# Patient Record
Sex: FEMALE | Race: WHITE | Employment: UNEMPLOYED | URBAN - METROPOLITAN AREA
[De-identification: names, ages, dates, MRNs, and addresses within clinical notes are randomized per-mention and may not be internally consistent; named-entity substitution may affect disease eponyms.]

---

## 2017-05-15 ENCOUNTER — HOSPITAL ENCOUNTER (OUTPATIENT)
Dept: LAB | Age: 27
Discharge: HOME | End: 2017-05-15
Attending: PSYCHIATRY & NEUROLOGY | Admitting: PSYCHIATRY & NEUROLOGY

## 2017-05-15 LAB
ANION GAP SERPL CALCULATED.3IONS-SCNC: 12 MMOL/L (ref 4–16)
BUN BLDV-MCNC: 11 MG/DL (ref 6–23)
CALCIUM SERPL-MCNC: 9 MG/DL (ref 8.3–10.6)
CHLORIDE BLD-SCNC: 104 MMOL/L (ref 99–110)
CHOLESTEROL: 108 MG/DL
CO2: 24 MMOL/L (ref 21–32)
CREAT SERPL-MCNC: 0.7 MG/DL (ref 0.6–1.1)
GFR AFRICAN AMERICAN: >60 ML/MIN/1.73M2
GFR NON-AFRICAN AMERICAN: >60 ML/MIN/1.73M2
GLUCOSE FASTING: 82 MG/DL (ref 70–99)
HDLC SERPL-MCNC: 38 MG/DL
LDL CHOLESTEROL CALCULATED: 60 MG/DL
POTASSIUM SERPL-SCNC: 4.5 MMOL/L (ref 3.5–5.1)
SODIUM BLD-SCNC: 140 MMOL/L (ref 135–145)
TRIGL SERPL-MCNC: 49 MG/DL

## 2017-05-17 ENCOUNTER — HOSPITAL ENCOUNTER (OUTPATIENT)
Dept: CARDIOLOGY | Age: 27
Discharge: HOME | End: 2017-05-17
Attending: NURSE PRACTITIONER | Admitting: NURSE PRACTITIONER

## 2017-05-17 DIAGNOSIS — R00.1 BRADYCARDIA: ICD-10-CM

## 2017-12-15 ENCOUNTER — HOSPITAL ENCOUNTER (OUTPATIENT)
Dept: MRI IMAGING | Age: 27
Discharge: HOME | End: 2017-12-15
Attending: SPECIALIST | Admitting: SPECIALIST

## 2017-12-15 DIAGNOSIS — M54.5 LOW BACK PAIN, UNSPECIFIED BACK PAIN LATERALITY, UNSPECIFIED CHRONICITY, WITH SCIATICA PRESENCE UNSPECIFIED: ICD-10-CM

## 2018-06-01 ENCOUNTER — HOSPITAL ENCOUNTER (OUTPATIENT)
Dept: OTHER | Age: 28
Discharge: HOME | End: 2018-06-01
Attending: PSYCHIATRY & NEUROLOGY | Admitting: PSYCHIATRY & NEUROLOGY

## 2018-06-11 LAB
CHOLESTEROL: 161 MG/DL
HDLC SERPL-MCNC: 38 MG/DL
LDL CHOLESTEROL DIRECT: 117 MG/DL
TRIGL SERPL-MCNC: 79 MG/DL

## 2019-02-11 ENCOUNTER — OFFICE VISIT (OUTPATIENT)
Dept: FAMILY MEDICINE CLINIC | Age: 29
End: 2019-02-11
Payer: COMMERCIAL

## 2019-02-11 VITALS
HEIGHT: 63 IN | SYSTOLIC BLOOD PRESSURE: 100 MMHG | DIASTOLIC BLOOD PRESSURE: 60 MMHG | WEIGHT: 220.2 LBS | BODY MASS INDEX: 39.02 KG/M2 | HEART RATE: 79 BPM

## 2019-02-11 DIAGNOSIS — F19.11 HISTORY OF DRUG ABUSE (HCC): ICD-10-CM

## 2019-02-11 DIAGNOSIS — Q76.49 CONGENITAL STENOSIS OF LUMBAR SPINE: ICD-10-CM

## 2019-02-11 DIAGNOSIS — M51.36 BULGING LUMBAR DISC: ICD-10-CM

## 2019-02-11 DIAGNOSIS — Z72.0 TOBACCO ABUSE: ICD-10-CM

## 2019-02-11 DIAGNOSIS — Z23 NEEDS FLU SHOT: ICD-10-CM

## 2019-02-11 DIAGNOSIS — E03.9 ACQUIRED HYPOTHYROIDISM: ICD-10-CM

## 2019-02-11 DIAGNOSIS — R32 URINARY INCONTINENCE, UNSPECIFIED TYPE: Primary | ICD-10-CM

## 2019-02-11 DIAGNOSIS — J45.909 UNCOMPLICATED ASTHMA, UNSPECIFIED ASTHMA SEVERITY, UNSPECIFIED WHETHER PERSISTENT: ICD-10-CM

## 2019-02-11 DIAGNOSIS — Z23 NEED FOR 23-POLYVALENT PNEUMOCOCCAL POLYSACCHARIDE VACCINE: ICD-10-CM

## 2019-02-11 PROBLEM — E66.01 SEVERE OBESITY WITH BODY MASS INDEX (BMI) OF 35.0 TO 39.9 WITH SERIOUS COMORBIDITY (HCC): Status: ACTIVE | Noted: 2019-02-11

## 2019-02-11 LAB
BILIRUBIN, POC: NEGATIVE
BLOOD URINE, POC: NEGATIVE
CLARITY, POC: CLEAR
COLOR, POC: YELLOW
GLUCOSE URINE, POC: NEGATIVE
KETONES, POC: NEGATIVE
LEUKOCYTE EST, POC: NEGATIVE
NITRITE, POC: NEGATIVE
PH, POC: 7
PROTEIN, POC: NEGATIVE
SPECIFIC GRAVITY, POC: 1.02
UROBILINOGEN, POC: NORMAL

## 2019-02-11 PROCEDURE — 90471 IMMUNIZATION ADMIN: CPT | Performed by: FAMILY MEDICINE

## 2019-02-11 PROCEDURE — 4004F PT TOBACCO SCREEN RCVD TLK: CPT | Performed by: FAMILY MEDICINE

## 2019-02-11 PROCEDURE — 81003 URINALYSIS AUTO W/O SCOPE: CPT | Performed by: FAMILY MEDICINE

## 2019-02-11 PROCEDURE — 90732 PPSV23 VACC 2 YRS+ SUBQ/IM: CPT | Performed by: FAMILY MEDICINE

## 2019-02-11 PROCEDURE — 99214 OFFICE O/P EST MOD 30 MIN: CPT | Performed by: FAMILY MEDICINE

## 2019-02-11 PROCEDURE — 90472 IMMUNIZATION ADMIN EACH ADD: CPT | Performed by: FAMILY MEDICINE

## 2019-02-11 PROCEDURE — G8417 CALC BMI ABV UP PARAM F/U: HCPCS | Performed by: FAMILY MEDICINE

## 2019-02-11 PROCEDURE — 90686 IIV4 VACC NO PRSV 0.5 ML IM: CPT | Performed by: FAMILY MEDICINE

## 2019-02-11 PROCEDURE — G8427 DOCREV CUR MEDS BY ELIG CLIN: HCPCS | Performed by: FAMILY MEDICINE

## 2019-02-11 PROCEDURE — G8482 FLU IMMUNIZE ORDER/ADMIN: HCPCS | Performed by: FAMILY MEDICINE

## 2019-02-11 RX ORDER — LEVOTHYROXINE SODIUM 0.05 MG/1
50 TABLET ORAL DAILY
Qty: 30 TABLET | Refills: 1 | Status: SHIPPED | OUTPATIENT
Start: 2019-02-11 | End: 2019-07-15 | Stop reason: SDUPTHER

## 2019-02-11 RX ORDER — TRAZODONE HYDROCHLORIDE 50 MG/1
50 TABLET ORAL NIGHTLY
COMMUNITY

## 2019-02-12 PROBLEM — F19.11 HISTORY OF DRUG ABUSE (HCC): Status: ACTIVE | Noted: 2019-02-12

## 2019-02-12 ASSESSMENT — ENCOUNTER SYMPTOMS
ABDOMINAL PAIN: 1
EYES NEGATIVE: 1
RESPIRATORY NEGATIVE: 1

## 2019-03-13 ENCOUNTER — TELEPHONE (OUTPATIENT)
Dept: FAMILY MEDICINE CLINIC | Age: 29
End: 2019-03-13

## 2019-03-15 DIAGNOSIS — N39.42 URINARY INCONTINENCE WITHOUT SENSORY AWARENESS: Primary | ICD-10-CM

## 2019-03-27 ENCOUNTER — HOSPITAL ENCOUNTER (OUTPATIENT)
Dept: MRI IMAGING | Age: 29
Discharge: HOME | End: 2019-03-27
Payer: COMMERCIAL

## 2019-03-27 DIAGNOSIS — N39.42 URINARY INCONTINENCE WITHOUT SENSORY AWARENESS: ICD-10-CM

## 2019-03-27 PROCEDURE — 72148 MRI LUMBAR SPINE W/O DYE: CPT

## 2019-06-13 ENCOUNTER — HOSPITAL ENCOUNTER (OUTPATIENT)
Dept: MRI IMAGING | Age: 29
Discharge: HOME | End: 2019-06-13
Payer: COMMERCIAL

## 2019-06-13 DIAGNOSIS — R32 URINARY INCONTINENCE, UNSPECIFIED TYPE: ICD-10-CM

## 2019-06-13 DIAGNOSIS — M48.04 SPINAL STENOSIS OF THORACIC REGION: ICD-10-CM

## 2019-06-13 DIAGNOSIS — M48.02 CERVICAL SPINAL STENOSIS: ICD-10-CM

## 2019-06-13 PROCEDURE — 72146 MRI CHEST SPINE W/O DYE: CPT

## 2019-06-13 PROCEDURE — 70551 MRI BRAIN STEM W/O DYE: CPT

## 2019-06-13 PROCEDURE — 72141 MRI NECK SPINE W/O DYE: CPT

## 2019-07-11 ENCOUNTER — HOSPITAL ENCOUNTER (OUTPATIENT)
Age: 29
Discharge: HOME | End: 2019-07-11
Payer: COMMERCIAL

## 2019-07-11 LAB — TSH HIGH SENSITIVITY: 6.97 UIU/ML (ref 0.27–4.2)

## 2019-07-11 PROCEDURE — 36415 COLL VENOUS BLD VENIPUNCTURE: CPT

## 2019-07-11 PROCEDURE — 84443 ASSAY THYROID STIM HORMONE: CPT

## 2019-07-15 ENCOUNTER — TELEPHONE (OUTPATIENT)
Dept: FAMILY MEDICINE CLINIC | Age: 29
End: 2019-07-15

## 2019-07-15 DIAGNOSIS — E03.9 ACQUIRED HYPOTHYROIDISM: Primary | ICD-10-CM

## 2019-07-15 RX ORDER — LEVOTHYROXINE SODIUM 0.05 MG/1
50 TABLET ORAL DAILY
Qty: 30 TABLET | Refills: 1 | Status: SHIPPED | OUTPATIENT
Start: 2019-07-15

## 2019-07-16 ENCOUNTER — OFFICE VISIT (OUTPATIENT)
Dept: FAMILY MEDICINE CLINIC | Age: 29
End: 2019-07-16
Payer: COMMERCIAL

## 2019-07-16 VITALS
HEART RATE: 82 BPM | RESPIRATION RATE: 15 BRPM | WEIGHT: 244 LBS | BODY MASS INDEX: 43.23 KG/M2 | DIASTOLIC BLOOD PRESSURE: 70 MMHG | OXYGEN SATURATION: 97 % | SYSTOLIC BLOOD PRESSURE: 110 MMHG | TEMPERATURE: 98.8 F | HEIGHT: 63 IN

## 2019-07-16 DIAGNOSIS — J45.909 UNCOMPLICATED ASTHMA, UNSPECIFIED ASTHMA SEVERITY, UNSPECIFIED WHETHER PERSISTENT: ICD-10-CM

## 2019-07-16 DIAGNOSIS — J20.9 ACUTE BRONCHITIS, UNSPECIFIED ORGANISM: Primary | ICD-10-CM

## 2019-07-16 DIAGNOSIS — N39.42 URINARY INCONTINENCE WITHOUT SENSORY AWARENESS: ICD-10-CM

## 2019-07-16 DIAGNOSIS — N90.7 CYST OF VULVA: ICD-10-CM

## 2019-07-16 PROCEDURE — G8417 CALC BMI ABV UP PARAM F/U: HCPCS | Performed by: FAMILY MEDICINE

## 2019-07-16 PROCEDURE — 99214 OFFICE O/P EST MOD 30 MIN: CPT | Performed by: FAMILY MEDICINE

## 2019-07-16 PROCEDURE — G8427 DOCREV CUR MEDS BY ELIG CLIN: HCPCS | Performed by: FAMILY MEDICINE

## 2019-07-16 PROCEDURE — 4004F PT TOBACCO SCREEN RCVD TLK: CPT | Performed by: FAMILY MEDICINE

## 2019-07-16 RX ORDER — ALBUTEROL SULFATE 90 UG/1
2 AEROSOL, METERED RESPIRATORY (INHALATION) EVERY 4 HOURS PRN
Qty: 1 INHALER | Refills: 2 | Status: SHIPPED | OUTPATIENT
Start: 2019-07-16

## 2019-07-16 RX ORDER — DOXYCYCLINE HYCLATE 100 MG
100 TABLET ORAL 2 TIMES DAILY
Qty: 14 TABLET | Refills: 0 | Status: SHIPPED | OUTPATIENT
Start: 2019-07-16 | End: 2019-07-23

## 2019-07-17 ASSESSMENT — ENCOUNTER SYMPTOMS
COUGH: 1
SHORTNESS OF BREATH: 1
CHEST TIGHTNESS: 1

## 2019-07-17 NOTE — PROGRESS NOTES
OFFICE VISIT      Patient ID: Junaid Esparza 1990  Chief Complaint   Patient presents with    Cyst     painful cyst vagina area, x 2 wk    URI     productive cough, thick white phelm, congestion, wheezing x 2-3 month       URI    Associated symptoms include coughing. Asthma   She complains of cough and shortness of breath. This is a chronic problem. The current episode started more than 1 year ago. The problem occurs daily. The cough is productive of sputum. Pertinent negatives include no fever. Her past medical history is significant for asthma. .  HP [facility] chief complaint    Cyst: Left labia. Painful. No drainage. Had a similar cyst in the past and had it drained in the emergency room. Review of Systems   Constitutional: Negative for chills, diaphoresis and fever. Respiratory: Positive for cough, chest tightness and shortness of breath. Genitourinary: Positive for urgency. .  ROS per HPI.   ROS is otherwise negative    Patient Active Problem List   Diagnosis    Hypothyroidism    Depression    Drug overdose    Asthma    Severe obesity with body mass index (BMI) of 35.0 to 39.9 with serious comorbidity ([address]    History of drug abuse       Past Medical History:   Diagnosis Date    Asthma     Depression     admitted 2003, 07, 08, 11    Thyroid disease     UTI (lower urinary tract infection)        Past Surgical History:   Procedure Laterality Date    APPENDECTOMY  2011    CHOLECYSTECTOMY  2007    DILATION AND CURETTAGE OF UTERUS  2010    TUBAL LIGATION  11/16/2012       Family History   Problem Relation Age of Onset    Diabetes Father     Heart Failure Father     High Cholesterol Father     Migraines Father     High Blood Pressure Father     Diabetes Mother     Asthma Mother     High Cholesterol Mother     High Blood Pressure Mother     Asthma Brother     Cancer Maternal Grandfather         prostate    Diabetes Maternal Grandfather     High Cholesterol Maternal Grandfather     Hypertension Maternal Grandfather     Colon Cancer Maternal Aunt        Current Outpatient Medications on File Prior to Visit   Medication Sig Dispense Refill    levothyroxine (LEVOTHROID) 50 MCG tablet Take 1 tablet by mouth daily (Patient not taking: Reported on 7/16/2019) 30 tablet 1    traZODone (DESYREL) 50 MG tablet Take 50 mg by mouth nightly physciatrist       No current facility-administered medications on file prior to visit. Objective:         Physical Exam   Constitutional: She appears well-developed and well-nourished. No distress. Obese   HENT:   Head: Normocephalic and atraumatic. Right Ear: Hearing, tympanic membrane and external ear normal.   Left Ear: Hearing, tympanic membrane and external ear normal.   Nose: Nose normal. No mucosal edema, rhinorrhea, nose lacerations, sinus tenderness or nasal deformity. Right sinus exhibits no maxillary sinus tenderness and no frontal sinus tenderness. Left sinus exhibits no maxillary sinus tenderness and no frontal sinus tenderness. Mouth/Throat: Oropharynx is clear and moist and mucous membranes are normal. No oropharyngeal exudate, posterior oropharyngeal edema or posterior oropharyngeal erythema. Eyes: Conjunctivae are normal.   Neck: No tracheal deviation present. No thyromegaly present. Cardiovascular: Normal rate, regular rhythm, S1 normal, S2 normal and normal heart sounds. Exam reveals no gallop and no friction rub. Pulmonary/Chest: No respiratory distress. She has no wheezes. She has no rales. Genitourinary:         Lymphadenopathy:        Head (right side): No submental, no submandibular and no posterior auricular adenopathy present. Head (left side): No submental, no submandibular and no posterior auricular adenopathy present. Right cervical: No superficial cervical, no deep cervical and no posterior cervical adenopathy present.        Left cervical: No superficial cervical, no deep

## 2020-07-10 ENCOUNTER — HOSPITAL ENCOUNTER (OUTPATIENT)
Dept: GENERAL RADIOLOGY | Age: 30
Discharge: HOME | End: 2020-07-10
Payer: COMMERCIAL

## 2020-07-10 ENCOUNTER — HOSPITAL ENCOUNTER (OUTPATIENT)
Age: 30
Discharge: HOME | End: 2020-07-10
Payer: COMMERCIAL

## 2020-07-10 PROCEDURE — 71046 X-RAY EXAM CHEST 2 VIEWS: CPT

## 2020-07-27 ENCOUNTER — HOSPITAL ENCOUNTER (OUTPATIENT)
Dept: CT IMAGING | Age: 30
Discharge: HOME | End: 2020-07-27
Payer: COMMERCIAL

## 2020-07-27 PROCEDURE — 71250 CT THORAX DX C-: CPT

## 2020-09-02 ENCOUNTER — HOSPITAL ENCOUNTER (OUTPATIENT)
Age: 30
Discharge: HOME | End: 2020-09-02
Payer: COMMERCIAL

## 2020-09-02 LAB
ALBUMIN SERPL-MCNC: 4.4 GM/DL (ref 3.4–5)
ALP BLD-CCNC: 91 IU/L (ref 40–128)
ALT SERPL-CCNC: 16 U/L (ref 10–40)
ANION GAP SERPL CALCULATED.3IONS-SCNC: 12 MMOL/L (ref 4–16)
AST SERPL-CCNC: 15 IU/L (ref 15–37)
BILIRUB SERPL-MCNC: 0.6 MG/DL (ref 0–1)
BUN BLDV-MCNC: 8 MG/DL (ref 6–23)
CALCIUM SERPL-MCNC: 9.4 MG/DL (ref 8.3–10.6)
CHLORIDE BLD-SCNC: 106 MMOL/L (ref 99–110)
CHOLESTEROL: 186 MG/DL
CO2: 21 MMOL/L (ref 21–32)
CREAT SERPL-MCNC: 0.6 MG/DL (ref 0.6–1.1)
GFR AFRICAN AMERICAN: >60 ML/MIN/1.73M2
GFR NON-AFRICAN AMERICAN: >60 ML/MIN/1.73M2
GLUCOSE BLD-MCNC: 94 MG/DL (ref 70–99)
HDLC SERPL-MCNC: 31 MG/DL
LDL CHOLESTEROL DIRECT: 123 MG/DL
POTASSIUM SERPL-SCNC: 4.3 MMOL/L (ref 3.5–5.1)
SODIUM BLD-SCNC: 139 MMOL/L (ref 135–145)
T4 FREE: 1.33 NG/DL (ref 0.9–1.8)
TOTAL PROTEIN: 7 GM/DL (ref 6.4–8.2)
TRIGL SERPL-MCNC: 179 MG/DL
TSH HIGH SENSITIVITY: 2.53 UIU/ML (ref 0.27–4.2)

## 2020-09-02 PROCEDURE — 83721 ASSAY OF BLOOD LIPOPROTEIN: CPT

## 2020-09-02 PROCEDURE — 84439 ASSAY OF FREE THYROXINE: CPT

## 2020-09-02 PROCEDURE — 36415 COLL VENOUS BLD VENIPUNCTURE: CPT

## 2020-09-02 PROCEDURE — 84443 ASSAY THYROID STIM HORMONE: CPT

## 2020-09-02 PROCEDURE — 80061 LIPID PANEL: CPT

## 2020-09-02 PROCEDURE — 80053 COMPREHEN METABOLIC PANEL: CPT

## 2021-04-19 ENCOUNTER — APPOINTMENT (OUTPATIENT)
Dept: CT IMAGING | Age: 31
End: 2021-04-19
Payer: COMMERCIAL

## 2021-04-19 ENCOUNTER — HOSPITAL ENCOUNTER (EMERGENCY)
Age: 31
Discharge: HOME | End: 2021-04-19
Payer: COMMERCIAL

## 2021-04-19 VITALS
SYSTOLIC BLOOD PRESSURE: 114 MMHG | OXYGEN SATURATION: 96 % | RESPIRATION RATE: 16 BRPM | HEART RATE: 52 BPM | DIASTOLIC BLOOD PRESSURE: 64 MMHG | TEMPERATURE: 98.2 F

## 2021-04-19 DIAGNOSIS — R11.2 NON-INTRACTABLE VOMITING WITH NAUSEA, UNSPECIFIED VOMITING TYPE: ICD-10-CM

## 2021-04-19 DIAGNOSIS — R10.9 ABDOMINAL PAIN, UNSPECIFIED ABDOMINAL LOCATION: Primary | ICD-10-CM

## 2021-04-19 DIAGNOSIS — Z98.51 H/O TUBAL LIGATION: ICD-10-CM

## 2021-04-19 LAB
ALBUMIN SERPL-MCNC: 4 GM/DL (ref 3.4–5)
ALP BLD-CCNC: 60 IU/L (ref 40–129)
ALT SERPL-CCNC: 16 U/L (ref 10–40)
ANION GAP SERPL CALCULATED.3IONS-SCNC: 11 MMOL/L (ref 4–16)
AST SERPL-CCNC: 13 IU/L (ref 15–37)
BACTERIA: NEGATIVE /HPF
BASOPHILS ABSOLUTE: 0.1 K/CU MM
BASOPHILS RELATIVE PERCENT: 0.7 % (ref 0–1)
BILIRUB SERPL-MCNC: 0.2 MG/DL (ref 0–1)
BILIRUBIN DIRECT: 0.2 MG/DL (ref 0–0.3)
BILIRUBIN URINE: NEGATIVE MG/DL
BILIRUBIN, INDIRECT: 0 MG/DL (ref 0–0.7)
BLOOD, URINE: NEGATIVE
BUN BLDV-MCNC: 13 MG/DL (ref 6–23)
CALCIUM SERPL-MCNC: 9.1 MG/DL (ref 8.3–10.6)
CHLORIDE BLD-SCNC: 104 MMOL/L (ref 99–110)
CLARITY: ABNORMAL
CO2: 21 MMOL/L (ref 21–32)
COLOR: YELLOW
CREAT SERPL-MCNC: 0.6 MG/DL (ref 0.6–1.1)
DIFFERENTIAL TYPE: ABNORMAL
EOSINOPHILS ABSOLUTE: 0.9 K/CU MM
EOSINOPHILS RELATIVE PERCENT: 7.4 % (ref 0–3)
GFR AFRICAN AMERICAN: >60 ML/MIN/1.73M2
GFR NON-AFRICAN AMERICAN: >60 ML/MIN/1.73M2
GLUCOSE BLD-MCNC: 114 MG/DL (ref 70–99)
GLUCOSE, URINE: NEGATIVE MG/DL
HCG QUALITATIVE: NEGATIVE
HCT VFR BLD CALC: 42.7 % (ref 37–47)
HEMOGLOBIN: 13.8 GM/DL (ref 12.5–16)
IMMATURE NEUTROPHIL %: 0.3 % (ref 0–0.43)
KETONES, URINE: ABNORMAL MG/DL
LEUKOCYTE ESTERASE, URINE: ABNORMAL
LIPASE: 20 IU/L (ref 13–60)
LYMPHOCYTES ABSOLUTE: 2.5 K/CU MM
LYMPHOCYTES RELATIVE PERCENT: 21 % (ref 24–44)
MCH RBC QN AUTO: 29.9 PG (ref 27–31)
MCHC RBC AUTO-ENTMCNC: 32.3 % (ref 32–36)
MCV RBC AUTO: 92.4 FL (ref 78–100)
MONOCYTES ABSOLUTE: 1 K/CU MM
MONOCYTES RELATIVE PERCENT: 8.2 % (ref 0–4)
MUCUS: ABNORMAL HPF
NITRITE URINE, QUANTITATIVE: NEGATIVE
NUCLEATED RBC %: 0 %
PDW BLD-RTO: 13.8 % (ref 11.7–14.9)
PH, URINE: 5 (ref 5–8)
PLATELET # BLD: 285 K/CU MM (ref 140–440)
PMV BLD AUTO: 12.2 FL (ref 7.5–11.1)
POTASSIUM SERPL-SCNC: 3.8 MMOL/L (ref 3.5–5.1)
PROTEIN UA: 30 MG/DL
RBC # BLD: 4.62 M/CU MM (ref 4.2–5.4)
RBC URINE: 1 /HPF (ref 0–6)
SEGMENTED NEUTROPHILS ABSOLUTE COUNT: 7.5 K/CU MM
SEGMENTED NEUTROPHILS RELATIVE PERCENT: 62.4 % (ref 36–66)
SODIUM BLD-SCNC: 136 MMOL/L (ref 135–145)
SPECIFIC GRAVITY UA: 1.02 (ref 1–1.03)
SQUAMOUS EPITHELIAL: 8 /HPF
TOTAL IMMATURE NEUTOROPHIL: 0.03 K/CU MM
TOTAL NUCLEATED RBC: 0 K/CU MM
TOTAL PROTEIN: 7 GM/DL (ref 6.4–8.2)
TRICHOMONAS: ABNORMAL /HPF
UROBILINOGEN, URINE: 2 MG/DL (ref 0.2–1)
WBC # BLD: 11.9 K/CU MM (ref 4–10.5)
WBC UA: 3 /HPF (ref 0–5)

## 2021-04-19 PROCEDURE — 99284 EMERGENCY DEPT VISIT MOD MDM: CPT

## 2021-04-19 PROCEDURE — 6370000000 HC RX 637 (ALT 250 FOR IP): Performed by: PHYSICIAN ASSISTANT

## 2021-04-19 PROCEDURE — 6360000002 HC RX W HCPCS: Performed by: PHYSICIAN ASSISTANT

## 2021-04-19 PROCEDURE — 96372 THER/PROPH/DIAG INJ SC/IM: CPT

## 2021-04-19 PROCEDURE — 84703 CHORIONIC GONADOTROPIN ASSAY: CPT

## 2021-04-19 PROCEDURE — 82248 BILIRUBIN DIRECT: CPT

## 2021-04-19 PROCEDURE — 81001 URINALYSIS AUTO W/SCOPE: CPT

## 2021-04-19 PROCEDURE — 74176 CT ABD & PELVIS W/O CONTRAST: CPT

## 2021-04-19 PROCEDURE — 80053 COMPREHEN METABOLIC PANEL: CPT

## 2021-04-19 PROCEDURE — 83690 ASSAY OF LIPASE: CPT

## 2021-04-19 PROCEDURE — 85025 COMPLETE CBC W/AUTO DIFF WBC: CPT

## 2021-04-19 RX ORDER — DICYCLOMINE HYDROCHLORIDE 10 MG/1
20 CAPSULE ORAL
Qty: 30 CAPSULE | Refills: 0 | Status: SHIPPED | OUTPATIENT
Start: 2021-04-19

## 2021-04-19 RX ORDER — ONDANSETRON 4 MG/1
4 TABLET, ORALLY DISINTEGRATING ORAL ONCE
Status: COMPLETED | OUTPATIENT
Start: 2021-04-19 | End: 2021-04-19

## 2021-04-19 RX ORDER — ONDANSETRON 2 MG/ML
4 INJECTION INTRAMUSCULAR; INTRAVENOUS ONCE
Status: DISCONTINUED | OUTPATIENT
Start: 2021-04-19 | End: 2021-04-19

## 2021-04-19 RX ORDER — DICYCLOMINE HYDROCHLORIDE 10 MG/ML
20 INJECTION INTRAMUSCULAR ONCE
Status: COMPLETED | OUTPATIENT
Start: 2021-04-19 | End: 2021-04-19

## 2021-04-19 RX ORDER — ACETAMINOPHEN 500 MG
1000 TABLET ORAL ONCE
Status: COMPLETED | OUTPATIENT
Start: 2021-04-19 | End: 2021-04-19

## 2021-04-19 RX ORDER — ONDANSETRON 4 MG/1
4 TABLET, ORALLY DISINTEGRATING ORAL EVERY 8 HOURS PRN
Qty: 15 TABLET | Refills: 0 | Status: SHIPPED | OUTPATIENT
Start: 2021-04-19

## 2021-04-19 RX ORDER — FENTANYL CITRATE 50 UG/ML
25 INJECTION, SOLUTION INTRAMUSCULAR; INTRAVENOUS ONCE
Status: DISCONTINUED | OUTPATIENT
Start: 2021-04-19 | End: 2021-04-19

## 2021-04-19 RX ADMIN — ACETAMINOPHEN 1000 MG: 500 TABLET ORAL at 11:56

## 2021-04-19 RX ADMIN — ONDANSETRON 4 MG: 4 TABLET, ORALLY DISINTEGRATING ORAL at 10:25

## 2021-04-19 RX ADMIN — DICYCLOMINE HYDROCHLORIDE 20 MG: 20 INJECTION, SOLUTION INTRAMUSCULAR at 10:25

## 2021-04-19 ASSESSMENT — PAIN DESCRIPTION - PAIN TYPE: TYPE: ACUTE PAIN

## 2021-04-19 ASSESSMENT — PAIN DESCRIPTION - FREQUENCY: FREQUENCY: CONTINUOUS

## 2021-04-19 ASSESSMENT — PAIN DESCRIPTION - ORIENTATION: ORIENTATION: LOWER

## 2021-04-19 ASSESSMENT — PAIN DESCRIPTION - LOCATION: LOCATION: ABDOMEN

## 2021-04-19 ASSESSMENT — PAIN SCALES - GENERAL
PAINLEVEL_OUTOF10: 7
PAINLEVEL_OUTOF10: 9

## 2021-04-19 NOTE — ED NOTES
After multiple IV attempts being unsuccessful, spoke with [facility] and CT changed to non-con and IV meds changed.  Pt updated and ok with this plan      Vidhya Norris RN  04/19/21 0848

## 2021-04-19 NOTE — ED PROVIDER NOTES
As physician-in-triage, I performed a virtual medical screening history and physical exam on this patient. HISTORY OF PRESENT ILLNESS  Alex Verduzco is a 27 y.o. female who presents emergency department with complaints of lower abdominal pain and nausea that started approximate 1 hour prior to arrival.  Pain is located in the suprapubic and periumbilical region of the abdomen with radiation to the upper abdomen. Pain is rated 9/10 and describes a throbbing stabbing pain that is constant. The pain is exacerbated with certain movements and positions. She has never experienced symptoms such as this in the past.  Prior abdominal surgical history includes appendectomy, tubal ligation, and cholecystectomy. Patient is uncertain of her last bowel movement. Has some associated nausea but no vomiting. Denies fevers, chills, chest pain, shortness of breath, dysuria, vaginal bleeding, vaginal discharge. PHYSICAL EXAM  BP (!) 142/85   Pulse 64   Temp 98.2 °F (36.8 °C) (Oral)   Resp 18   SpO2 96%     On exam, the patient is nontoxic-appearing. Speech is clear. Breathing is unlabored. Moves all extremities    Orders: CBC, BMP, hepatic panel, lipase, pregnancy screening, urinalysis, CT of the abdomen/pelvis, fentanyl, Zofran.         Artur Wright DO  04/19/21 8664

## 2021-04-19 NOTE — ED PROVIDER NOTES
EMERGENCY DEPARTMENT ENCOUNTER      PCP: KATELIN Flores - NP    [facility]    Chief Complaint   Patient presents with    Abdominal Pain     lower abdominal pain, patient reports started just prior to arrival when she woke up        This patient was not evaluated by the attending physician. I have independently evaluated this patient. HPI    Justine Crowley is a 27 y.o. female who presents with mid abdominal pain. Onset this morning around 4 AM.  Patient has associated nausea. Patient denies vomiting or diarrhea. Patient describes pain as stabbing. No known aggravating or alleviating factors. Patient does take naproxen twice daily. Patient denies history of ulcers. Patient denies chest pain, shortness of breath, fever, urinary or vaginal symptoms. Patient has history of appendectomy and cholecystectomy. REVIEW OF SYSTEMS    Constitutional:  Denies fever  HENT:  Denies sore throat or ear pain   Cardiovascular:  Denies chest pain  Respiratory:  Denies cough or shortness of breath   GI:  See HPI above  : No hematuria or dysuria. No vaginal symptoms. Musculoskeletal:  Denies back pain. No pain or swelling of extremities.   Skin:  Denies rash  Neurologic:  Denies headache, focal weakness or sensory changes   Lymphatic:  Denies swollen glands     All other review of systems are negative  See HPI and nursing notes for additional information     PAST MEDICAL & SURGICAL HISTORY    Past Medical History:   Diagnosis Date    Asthma     Depression     admitted 2003, 07, 08, 11    Thyroid disease     UTI (lower urinary tract infection)      Past Surgical History:   Procedure Laterality Date    APPENDECTOMY  2011    CHOLECYSTECTOMY  2007    DILATION AND CURETTAGE OF UTERUS  2010    TUBAL LIGATION  11/16/2012       CURRENT MEDICATIONS    Current Outpatient Rx   Medication Sig Dispense Refill    dicyclomine (BENTYL) 10 MG capsule Take 2 capsules by mouth 4 times daily (before meals and nightly) 30 capsule 0    ondansetron (ZOFRAN ODT) 4 MG disintegrating tablet Take 1 tablet by mouth every 8 hours as needed for Nausea or Vomiting 15 tablet 0    albuterol sulfate HFA (PROAIR HFA) 108 (90 Base) MCG/ACT inhaler Inhale 2 puffs into the lungs every 4 hours as needed for Wheezing or Shortness of Breath 1 Inhaler 2    Diapers & Supplies MISC 1 each by Does not apply route 2 times daily 60 each 11    levothyroxine (LEVOTHROID) 50 MCG tablet Take 1 tablet by mouth daily (Patient not taking: Reported on 7/16/2019) 30 tablet 1    traZODone (DESYREL) 50 MG tablet Take 50 mg by mouth nightly physciatrist         ALLERGIES    Allergies   Allergen Reactions    Bactrim [Sulfamethoxazole-Trimethoprim] Anaphylaxis, Shortness Of Breath and Rash       SOCIAL AND FAMILY HISTORY    Social History     Socioeconomic History    Marital status: Legally      Spouse name: Not on file    Number of children: Not on file    Years of education: Not on file    Highest education level: Not on file   Occupational History    Occupation: unemployed   Social Needs    Financial resource strain: Not on file    Food insecurity     Worry: Not on file     Inability: Not on file   e-Zassi needs     Medical: Not on file     Non-medical: Not on file   Tobacco Use    Smoking status: Current Every Day Smoker     Packs/day: 1.00     Types: Cigarettes    Smokeless tobacco: Never Used   Substance and Sexual Activity    Alcohol use: Yes     Comment: yearly    Drug use: Yes     Types: Marijuana    Sexual activity: Not on file   Lifestyle    Physical activity     Days per week: Not on file     Minutes per session: Not on file    Stress: Not on file   Relationships    Social connections     Talks on phone: Not on file     Gets together: Not on file     Attends [employer] service: Not on file     Active member of club or organization: Not on file     Attends meetings of clubs or organizations: Not on file     Relationship 16.0 GM/DL    Hematocrit 42.7 37 - 47 %    MCV 92.4 78 - 100 FL    MCH 29.9 27 - 31 PG    MCHC 32.3 32.0 - 36.0 %    RDW 13.8 11.7 - 14.9 %    Platelets 411 674 - 257 K/CU MM    MPV 12.2 (H) 7.5 - 11.1 FL    Differential Type AUTOMATED DIFFERENTIAL     Segs Relative 62.4 36 - 66 %    Lymphocytes % 21.0 (L) 24 - 44 %    Monocytes % 8.2 (H) 0 - 4 %    Eosinophils % 7.4 (H) 0 - 3 %    Basophils % 0.7 0 - 1 %    Segs Absolute 7.5 K/CU MM    Lymphocytes Absolute 2.5 K/CU MM    Monocytes Absolute 1.0 K/CU MM    Eosinophils Absolute 0.9 K/CU MM    Basophils Absolute 0.1 K/CU MM    Nucleated RBC % 0.0 %    Total Nucleated RBC 0.0 K/CU MM    Total Immature Neutrophil 0.03 K/CU MM    Immature Neutrophil % 0.3 0 - 0.43 %   BMP   Result Value Ref Range    Sodium 136 135 - 145 MMOL/L    Potassium 3.8 3.5 - 5.1 MMOL/L    Chloride 104 99 - 110 mMol/L    CO2 21 21 - 32 MMOL/L    Anion Gap 11 4 - 16    BUN 13 6 - 23 MG/DL    CREATININE 0.6 0.6 - 1.1 MG/DL    Glucose 114 (H) 70 - 99 MG/DL    Calcium 9.1 8.3 - 10.6 MG/DL    GFR Non-African American >60 >60 mL/min/1.73m2    GFR African American >60 >60 mL/min/1.73m2   Hepatic Function Panel (LFTs)   Result Value Ref Range    Albumin 4.0 3.4 - 5.0 GM/DL    Total Bilirubin 0.2 0.0 - 1.0 MG/DL    Bilirubin, Direct 0.2 0.0 - 0.3 MG/DL    Bilirubin, Indirect 0.0 0 - 0.7 MG/DL    Alkaline Phosphatase 60 40 - 129 IU/L    AST 13 (L) 15 - 37 IU/L    ALT 16 10 - 40 U/L    Total Protein 7.0 6.4 - 8.2 GM/DL   Lipase   Result Value Ref Range    Lipase 20 13 - 60 IU/L   HCG Serum, Qualitative   Result Value Ref Range    hCG Qual NEGATIVE    Urinalysis with microscopic   Result Value Ref Range    Color, UA YELLOW YELLOW    Clarity, UA SLIGHTLY CLOUDY (A) CLEAR    Glucose, Urine NEGATIVE NEGATIVE MG/DL    Bilirubin Urine NEGATIVE NEGATIVE MG/DL    Ketones, Urine SMALL (A) NEGATIVE MG/DL    Specific Gravity, UA 1.025 1.001 - 1.035    Blood, Urine NEGATIVE NEGATIVE    pH, Urine 5.0 5.0 - 8.0    Protein, UA 30 (A) NEGATIVE MG/DL    Urobilinogen, Urine 2.0 (H) 0.2 - 1.0 MG/DL    Nitrite Urine, Quantitative NEGATIVE NEGATIVE    Leukocyte Esterase, Urine TRACE (A) NEGATIVE    RBC, UA 1 0 - 6 /HPF    WBC, UA 3 0 - 5 /HPF    Bacteria, UA NEGATIVE NEGATIVE /HPF    Squam Epithel, UA 8 /HPF    Mucus, UA RARE (A) NEGATIVE HPF    Trichomonas, UA NONE SEEN NONE SEEN /HPF           RADIOLOGY/PROCEDURES    CT ABDOMEN PELVIS WO CONTRAST Additional Contrast? None   Final Result   *Negative noncontrast CT examination of the abdomen and pelvis with no   evidence of nephrolithiasis, obstructive uropathy or other acute process. *Interval displacement of bilateral tubal ligation clips as described. *Few incidental and chronic/postsurgical findings as detailed above. ED COURSE & MEDICAL DECISION MAKING      Patient presents as above. CBC shows mild elevation white blood cell count of 11.9. CMP within normal limits. Lipase is 20. Pregnancy negative. Urinalysis shows small ketones, trace leukocytes, 3 white blood cells with negative bacteria. Patient denies urinary symptoms, urine with the squamous of the cells and I suspect this is contaminant. Nurse was unable to get IV access and IM Bentyl and oral Zofran as ordered. CT abdomen pelvis without contrast shows no acute abnormality, interval displacement of bilateral tubal ligation clips. I discussed labs and imaging with patient today. Patient states pain improved with Bentyl. I recommend holding naproxen and continuing her antacid medication. Patient will be provided prescription for Zofran and Bentyl. Recommend gradual increase in diet as tolerated. I recommend follow-up with primary care and gastroenterology for further evaluation of abdominal pain. Recommend follow-up with gynecologist due to displacement of bilateral tubal ligation clips. Clinical  IMPRESSION    1. Abdominal pain, unspecified abdominal location    2.  Non-intractable vomiting with nausea, unspecified vomiting type    3. H/O tubal ligation        I discussed with patient importance return to the emergency department immediately if new or worsening symptoms develop. Comment: Please note this report has been produced using speech recognition software and may contain errors related to that system including errors in grammar, punctuation, and spelling, as well as words and phrases that may be inappropriate. If there are any questions or concerns please feel free to contact the dictating provider for clarification.       Dayanna Pierce PA-C  04/19/21 2858

## 2021-04-19 NOTE — ED NOTES
2 unsuccessful IV attempts right upper arm and right FA- bleeding controlled- 2x2 Nikos Lui asked to attempt      Neto Amaro, MORAIMA  04/19/21 0154

## 2021-05-03 ENCOUNTER — HOSPITAL ENCOUNTER (OUTPATIENT)
Age: 31
Discharge: HOME | End: 2021-05-03
Payer: COMMERCIAL

## 2021-05-03 LAB
BASOPHILS ABSOLUTE: 0.1 K/CU MM
BASOPHILS RELATIVE PERCENT: 1.3 % (ref 0–1)
CHOLESTEROL: 164 MG/DL
DIFFERENTIAL TYPE: ABNORMAL
EOSINOPHILS ABSOLUTE: 0.6 K/CU MM
EOSINOPHILS RELATIVE PERCENT: 7.3 % (ref 0–3)
ESTIMATED AVERAGE GLUCOSE: 103 MG/DL
HBA1C MFR BLD: 5.2 % (ref 4.2–6.3)
HCT VFR BLD CALC: 46 % (ref 37–47)
HDLC SERPL-MCNC: 33 MG/DL
HEMOGLOBIN: 14.3 GM/DL (ref 12.5–16)
IMMATURE NEUTROPHIL %: 0.3 % (ref 0–0.43)
LDL CHOLESTEROL DIRECT: 117 MG/DL
LYMPHOCYTES ABSOLUTE: 3 K/CU MM
LYMPHOCYTES RELATIVE PERCENT: 37.3 % (ref 24–44)
MCH RBC QN AUTO: 30 PG (ref 27–31)
MCHC RBC AUTO-ENTMCNC: 31.1 % (ref 32–36)
MCV RBC AUTO: 96.4 FL (ref 78–100)
MONOCYTES ABSOLUTE: 0.8 K/CU MM
MONOCYTES RELATIVE PERCENT: 10 % (ref 0–4)
NUCLEATED RBC %: 0 %
PDW BLD-RTO: 14.2 % (ref 11.7–14.9)
PLATELET # BLD: 285 K/CU MM (ref 140–440)
PMV BLD AUTO: 12.3 FL (ref 7.5–11.1)
RBC # BLD: 4.77 M/CU MM (ref 4.2–5.4)
SEGMENTED NEUTROPHILS ABSOLUTE COUNT: 3.5 K/CU MM
SEGMENTED NEUTROPHILS RELATIVE PERCENT: 43.8 % (ref 36–66)
T4 FREE: 1.31 NG/DL (ref 0.9–1.8)
TOTAL IMMATURE NEUTOROPHIL: 0.02 K/CU MM
TOTAL NUCLEATED RBC: 0 K/CU MM
TRIGL SERPL-MCNC: 138 MG/DL
TSH HIGH SENSITIVITY: 3.15 UIU/ML (ref 0.27–4.2)
WBC # BLD: 8 K/CU MM (ref 4–10.5)

## 2021-05-03 PROCEDURE — 83036 HEMOGLOBIN GLYCOSYLATED A1C: CPT

## 2021-05-03 PROCEDURE — 36415 COLL VENOUS BLD VENIPUNCTURE: CPT

## 2021-05-03 PROCEDURE — 83721 ASSAY OF BLOOD LIPOPROTEIN: CPT

## 2021-05-03 PROCEDURE — 85025 COMPLETE CBC W/AUTO DIFF WBC: CPT

## 2021-05-03 PROCEDURE — 84443 ASSAY THYROID STIM HORMONE: CPT

## 2021-05-03 PROCEDURE — 80061 LIPID PANEL: CPT

## 2021-05-03 PROCEDURE — 84439 ASSAY OF FREE THYROXINE: CPT

## 2021-07-16 ENCOUNTER — TELEPHONE (OUTPATIENT)
Dept: CARDIOLOGY CLINIC | Age: 31
End: 2021-07-16

## 2021-07-16 ENCOUNTER — INITIAL CONSULT (OUTPATIENT)
Dept: CARDIOLOGY CLINIC | Age: 31
End: 2021-07-16
Payer: COMMERCIAL

## 2021-07-16 VITALS
BODY MASS INDEX: 36.5 KG/M2 | HEIGHT: 63 IN | SYSTOLIC BLOOD PRESSURE: 100 MMHG | WEIGHT: 206 LBS | HEART RATE: 72 BPM | DIASTOLIC BLOOD PRESSURE: 70 MMHG

## 2021-07-16 DIAGNOSIS — R42 DIZZINESS: ICD-10-CM

## 2021-07-16 DIAGNOSIS — I49.8 SINUS ARRHYTHMIA: ICD-10-CM

## 2021-07-16 DIAGNOSIS — R55 SYNCOPE, UNSPECIFIED SYNCOPE TYPE: Primary | ICD-10-CM

## 2021-07-16 PROCEDURE — G8427 DOCREV CUR MEDS BY ELIG CLIN: HCPCS | Performed by: INTERNAL MEDICINE

## 2021-07-16 PROCEDURE — 93000 ELECTROCARDIOGRAM COMPLETE: CPT | Performed by: INTERNAL MEDICINE

## 2021-07-16 PROCEDURE — G8417 CALC BMI ABV UP PARAM F/U: HCPCS | Performed by: INTERNAL MEDICINE

## 2021-07-16 PROCEDURE — 99244 OFF/OP CNSLTJ NEW/EST MOD 40: CPT | Performed by: INTERNAL MEDICINE

## 2021-07-16 NOTE — LETTER
[facility]     Dr. Surendra Brown     Tilt Table Procedure     Patient Name: Trey Higgins   : 1990  MRN# [id number]     Date of Procedure: 21 Time: 1:00 Arrival Time: 11:00     Hospital: [facility])     Call to [facility] at [phone] 2 days before your procedure    X Please have blood work and chest-x-ray done 1 to [facility]. X Please do not have anything by mouth after midnight prior to or 8 hours before  the procedure. X You will need to arrive at the hospital 1 hour prior to the procedure. When you arrive  at the hospital please go to the registration desk. X You will need to arrange for someone to drive you home after the procedure. X Please wear comfortable clothing for the procedure. Patient Signature:____________________________       Staff Signature: Jennifer Oliva RN                                 [place] [facility]     Dr. Surendra Brown      Tilt Table Test   A tilt table test is used to check people who have fainted or who often feel lightheaded. The results help your doctor know the cause of your fainting or feeling lightheaded. The test uses a special table that slowly tilts you to an upright position. It checks how your body responds when you change positions. The test will take about an hour. It may take longer if you get medicine to speed up your heart during the test.  Why is this test done? This test checks what causes your symptoms by monitoring them while changing your position. Your doctor can see if you faint or feel lightheaded because of problems with your heart rate or blood pressure. When people move from a lying position to an upright one, their blood pressure normally drops. But the body adjusts to this. Your nervous system senses changes in body position and controls your heart rate and blood pressure.   If the nervous system doesn't work properly, you might feel lightheaded or faint. This can happen if your blood pressure stays too low. Your heart rate also may slow down or speed up. You feel lightheaded because your brain is not getting a normal amount of blood for a short time. This problem is called syncope. Syncope might happen during the test when you change to an upright position. During the Test: The test is usually done in a hospital. You will have small patches or pads attached to your skin. These are sensors that monitor your heart. You will also have a blood pressure cuff on your arm. And you may have an IV. You will lie flat on a table that can tilt you up to almost a standing position. You will be strapped securely to the table. Your heart rate and blood pressure are checked regularly as the table is tilted up. You will be asked if you feel any symptoms like nausea, sweating, dizziness, or an abnormal heartbeat. If you don't have any symptoms, you may be given medicine to speed up your heart rate. Then you will be checked for symptoms again. If you faint during the test, the table will be returned to a flat position. You will be checked closely and taken care of right away by your medical team. Most people wake up right away. Results of the test: The test result is normal if your blood pressure stays stable during the test and you do not feel lightheaded or faint. The test result is not normal if your blood pressure drops and you feel lightheaded or faint. These symptoms might happen because of a slow heart rate. After the Test: Your heart rate and blood pressure will be checked before you go home. You may need to have someone drive you home after the test. You can probably go back to your usual activities right away. But some people feel a little tired or nauseated  Follow-up care is a key part of your treatment and safety. Be sure to make and go to all appointments, and call your doctor if you are having problems.  It's also a good idea to keep a list of the medicines you take. Ask your doctor when you can expect to have your test results. [facility]     Dr. Lenny Blanco              Patient Name: Zeb Weldon   : 1990  MRN# [id number]    TODAYS DATE: 21    DATE OF PROCEDURE: 21      DX: Syncope R55             Tilt Table Procedure           X BMP        X MAGNESIUM       ? PLEASE CALL ABNORMAL RESULTS TO THE  PHYSICIAN? ATTENTION PATIENT: Pretesting is to be done before the cath. You do not have to fast for the lab work. You must go to the [facility]   behind the [facility] at [address] Marlena Hanson. to have this lab work done. Phone: [phone] Hours: 7:00 am to 5:00 pm           PHYSICIANS SIGNATURE________________________DATE/TIME___________________                               [facility]) Informed Consent for Anesthesia/Sedation, Surgery, Invasive Procedures, and other High-risk Interventions and Medication use      *This consent is applicable for 30 days following patient signature*    Procedure(s)   ILali authorize, Dr. Lenny Blanco    and the associate(s) or assistant(s) of his/her choice, to perform the following procedure(s):Tilt Table Procedure    I know that unexpected conditions may require additional or different procedures than those above. I authorize the above named practitioner(s) perform these as necessary and desirable. This is based on the practitioners professional judgment.     The above named practitioner has discussed the above procedure(s) with me, including:   Potential benefits, including likelihood of success of the procedure(s) goals   Risks   Side effects, risk of death, and risk of infection   Any potential problems that might occur during recuperation or healing post-procedure   Reasonable alternatives   Risks of NOT performing the procedure(s)    I acknowledge that no warranty or surgery and immediate post-operative period through Phase 2 recovery. This means, for that time period, I will be a Full-code and receive CPR, intubation, chest compressions, medications, and/or other life saving measures, if I have a cardiac or respiratory arrest.    ___ I WANT to keep my DNR in effect during my procedure(s) and immediate post-operative recovery period through Phase 2 recovery. (Complete separate refusal form)     This form has been fully explained to me. I understand its contents. Patients Signature: ___________________________Date: ________  Time: ________    If patient unable to sign, has engaged the [address] [place] [address], is a minor, or has a court-appointed Guardian:  [facility] Representative Name (Print):  ____________________________________      Relationship ([place] one):    Guardian   Parent    Spouse    HCPOA   Child   Sibling  Next-of-Kin Friend    Patients Representative Signature: _______________________________________              Date: ______________  Time: __________    An  was used.  name/ID: _________________________________      [facility]) Witness________________________  Date: ________   Time: _________    Physician/Practitioner _______________________  Date: ________   Time: _________           Revision 2017      [facility]     Dr. Chrystal Morris     PROCEDURE TO SCHEDULE:    Tilt Table Procedure    Patient Name: Jean-Paul Nguyen   : 1990  MRN# [id number]  Home Phone Number: [phone]   Weight:    Wt Readings from Last 3 Encounters:   21 206 lb (93.4 kg)   19 244 lb (110.7 kg)   19 220 lb 3.2 oz (99.9 kg)        Insurance: Payor: Rangel Gambino / Plan: Ivan Colbert / Product Type: *No Product type* /     Date of Procedure: 21 Time: 1300 Arrival Time: 1200    Diagnosis:  Syncope R55  Allergies:    Allergies   Allergen Reactions    Bactrim [Sulfamethoxazole-Trimethoprim] Anaphylaxis,

## 2021-07-16 NOTE — PROGRESS NOTES
Electrophysiology Consult Note      Reason for consultation: Syncope    Chief complaint : Syncope    Referring physician: Donna Dickerson      Primary care physician: KATELIN Son NP      History of Present Illness:     Patient is a 70-year-old female with history of obesity, history of drug abuse, asthma referred by Dr. Nigel Carnes for syncope. Patient reports that she had 3 syncopal episodes --  2 episodes where post shower she got dizzy and fell and she immediately woke up. Another episode was when she was talking with friends and got dizzy and passed out and immediately woke up. All the episodes where when she was standing. Patient feels dizzy prior to passing out. Patient denies any chest pain, shortness of breath, palpitation. Patient reported that she vapes and it has nicotine. Patient denies alcohol use. Patient drinks BANG energy drinks 1-2 times a week`      Pastmedical history:   Past Medical History:   Diagnosis Date    Asthma     Depression     admitted 2003, 07, 08, 11    Thyroid disease     UTI (lower urinary tract infection)        Surgical history :   Past Surgical History:   Procedure Laterality Date    APPENDECTOMY  2011    CHOLECYSTECTOMY  2007    DILATION AND CURETTAGE OF UTERUS  2010    TUBAL LIGATION  11/16/2012       Family history:   Family History   Problem Relation Age of Onset    Diabetes Father     Heart Failure Father     High Cholesterol Father     Migraines Father     High Blood Pressure Father     Diabetes Mother     Asthma Mother     High Cholesterol Mother     High Blood Pressure Mother     Asthma Brother     Cancer Maternal Grandfather         prostate    Diabetes Maternal Grandfather     High Cholesterol Maternal Grandfather     Hypertension Maternal Grandfather     Colon Cancer Maternal Aunt        Social history :  reports that she has been smoking cigarettes. She has been smoking about 1.00 pack per day.  She has never used smokeless tobacco. She reports current alcohol use. She reports current drug use. Drug: Marijuana. Allergies   Allergen Reactions    Bactrim [Sulfamethoxazole-Trimethoprim] Anaphylaxis, Shortness Of Breath and Rash       Current Outpatient Medications on File Prior to Visit   Medication Sig Dispense Refill    dicyclomine (BENTYL) 10 MG capsule Take 2 capsules by mouth 4 times daily (before meals and nightly) 30 capsule 0    ondansetron (ZOFRAN ODT) 4 MG disintegrating tablet Take 1 tablet by mouth every 8 hours as needed for Nausea or Vomiting 15 tablet 0    albuterol sulfate HFA (PROAIR HFA) 108 (90 Base) MCG/ACT inhaler Inhale 2 puffs into the lungs every 4 hours as needed for Wheezing or Shortness of Breath 1 Inhaler 2    Diapers & Supplies MISC 1 each by Does not apply route 2 times daily 60 each 11    levothyroxine (LEVOTHROID) 50 MCG tablet Take 1 tablet by mouth daily 30 tablet 1    traZODone (DESYREL) 50 MG tablet Take 50 mg by mouth nightly physciatrist       No current facility-administered medications on file prior to visit. Review of Systems:   Review of Systems   Constitutional: Negative for activity change, chills, fatigue and fever. HENT: Negative for congestion, ear pain and tinnitus. Eyes: Negative for photophobia, pain and visual disturbance. Respiratory: Negative for cough, chest tightness, shortness of breath and wheezing. Cardiovascular: Negative for chest pain, palpitations and leg swelling. Gastrointestinal: Negative for abdominal pain, blood in stool, constipation, diarrhea, nausea and vomiting. Endocrine: Negative for cold intolerance and heat intolerance. Genitourinary: Negative for dysuria, flank pain and hematuria. Musculoskeletal: Negative for arthralgias, back pain, myalgias and neck stiffness. Skin: Negative for color change and rash. Allergic/Immunologic: Negative for food allergies. Neurological: Positive for syncope.  Negative for dizziness, light-headedness, numbness and headaches. Hematological: Does not bruise/bleed easily. Psychiatric/Behavioral: Negative for agitation, behavioral problems and confusion. Examination:      Vitals:    07/16/21 1236 07/16/21 1306 07/16/21 1307 07/16/21 1308   BP: 112/70 110/70 100/70 100/70   Position:  Supine Sitting Standing   Pulse: 57 62 64 72   Weight: 206 lb (93.4 kg)      Height: 5' 3\" (1.6 m)        Body mass index is 36.49 kg/m². Physical Exam  Constitutional:       General: She is not in acute distress. Appearance: She is not ill-appearing or diaphoretic. HENT:      Head: Normocephalic and atraumatic. Right Ear: External ear normal.      Left Ear: External ear normal.      Nose: No congestion. Mouth/Throat:      Mouth: Mucous membranes are moist.   Eyes:      Extraocular Movements: Extraocular movements intact. Conjunctiva/sclera: Conjunctivae normal.      Pupils: Pupils are equal, round, and reactive to light. Cardiovascular:      Rate and Rhythm: Normal rate and regular rhythm. Heart sounds: No murmur heard. Pulmonary:      Effort: Pulmonary effort is normal. No respiratory distress. Breath sounds: Normal breath sounds. No wheezing or rhonchi. Abdominal:      General: Abdomen is flat. Bowel sounds are normal. There is no distension. Palpations: Abdomen is soft. Tenderness: There is no abdominal tenderness. Musculoskeletal:         General: No tenderness. Cervical back: Normal range of motion. No tenderness. Right lower leg: No edema. Left lower leg: No edema. Skin:     General: Skin is warm. Neurological:      General: No focal deficit present. Mental Status: She is alert and oriented to person, place, and time. Cranial Nerves: No cranial nerve deficit.    Psychiatric:         Mood and Affect: Mood normal.               CBC:   Lab Results   Component Value Date    WBC 8.0 05/03/2021    HGB 14.3 05/03/2021    HCT 46.0 05/03/2021     05/03/2021     Lipids:  Lab Results   Component Value Date    CHOL 164 05/03/2021    TRIG 138 05/03/2021    HDL 33 (L) 05/03/2021    LDLCALC 60 05/15/2017    LDLDIRECT 117 (H) 05/03/2021     PT/INR: No results found for: INR     BMP:    Lab Results   Component Value Date     04/19/2021    K 3.8 04/19/2021     04/19/2021    CO2 21 04/19/2021    BUN 13 04/19/2021     CMP:   Lab Results   Component Value Date    AST 13 (L) 04/19/2021    PROT 7.0 04/19/2021    BILITOT 0.2 04/19/2021    ALKPHOS 60 04/19/2021     TSH:    Lab Results   Component Value Date    TSH 1.67 07/13/2016       EKGINTERPRETATION - EKG Interpretation:  Sinus bradycardia      IMPRESSION / RECOMMENDATIONS:     Syncope  History of drug use and now reportedly sober  Nicotine use through vaping  Obesity BMI 36  Hypothyroid on levothyroxine  Asthma on inhalers prn    Syncope X 3 - two episodes post shower got dizzy and fell and she immediately woke up. another episode she was talking with friends and got dizzy and passed out and immediately woke up. Based on her description it appears to be like vasovagal syncope. We will try to get her evaluated with a tilt table test.      We will get an echo given her history of drug abuse to assess if there is no structural cause for syncope. Discussed with the patient to avoid caffeine/ energy drinks and also discussed about recent data on vaping causing acute lung injury. Patient voiced understanding    Patient has wearing compression socks and has helped and she does not have syncope but she still feels dizzy at times. Orthostatic was negative    Patient has history of methamphetamine abuse and has been sober for last 2 years        Thanks again for allowing me to participate in care of this patient. Please call me if you have any questions. With best regards.       Amarilis Campbell MD, 7/16/2021 12:51 PM     Please note this report has been partially produced using speech recognition software and may contain errors related to that system including errors in grammar, punctuation, and spelling, as well as words and phrases that may be inappropriate. If there are any questions or concerns please feel free to contact the dictating provider for clarification.

## 2021-07-23 ENCOUNTER — HOSPITAL ENCOUNTER (OUTPATIENT)
Age: 31
Discharge: HOME | End: 2021-07-23
Payer: COMMERCIAL

## 2021-07-23 LAB
ANION GAP SERPL CALCULATED.3IONS-SCNC: 11 MMOL/L (ref 4–16)
BUN BLDV-MCNC: 16 MG/DL (ref 6–23)
CALCIUM SERPL-MCNC: 9.2 MG/DL (ref 8.3–10.6)
CHLORIDE BLD-SCNC: 107 MMOL/L (ref 99–110)
CO2: 24 MMOL/L (ref 21–32)
CREAT SERPL-MCNC: 0.5 MG/DL (ref 0.6–1.1)
GFR AFRICAN AMERICAN: >60 ML/MIN/1.73M2
GFR NON-AFRICAN AMERICAN: >60 ML/MIN/1.73M2
GLUCOSE BLD-MCNC: 87 MG/DL (ref 70–99)
MAGNESIUM: 2.3 MG/DL (ref 1.8–2.4)
POTASSIUM SERPL-SCNC: 4.3 MMOL/L (ref 3.5–5.1)
SODIUM BLD-SCNC: 142 MMOL/L (ref 135–145)

## 2021-07-23 PROCEDURE — 80048 BASIC METABOLIC PNL TOTAL CA: CPT

## 2021-07-23 PROCEDURE — 36415 COLL VENOUS BLD VENIPUNCTURE: CPT

## 2021-07-23 PROCEDURE — 83735 ASSAY OF MAGNESIUM: CPT

## 2021-07-26 ENCOUNTER — HOSPITAL ENCOUNTER (OUTPATIENT)
Dept: NON INVASIVE DIAGNOSTICS | Age: 31
Discharge: HOME | End: 2021-07-26
Payer: COMMERCIAL

## 2021-07-26 VITALS
SYSTOLIC BLOOD PRESSURE: 95 MMHG | OXYGEN SATURATION: 100 % | RESPIRATION RATE: 14 BRPM | HEART RATE: 49 BPM | DIASTOLIC BLOOD PRESSURE: 50 MMHG

## 2021-07-26 PROCEDURE — 7100000000 HC PACU RECOVERY - FIRST 15 MIN

## 2021-07-26 PROCEDURE — 99999 PR OFFICE/OUTPT VISIT,PROCEDURE ONLY: CPT | Performed by: NURSE PRACTITIONER

## 2021-07-26 PROCEDURE — 7100000001 HC PACU RECOVERY - ADDTL 15 MIN

## 2021-07-26 PROCEDURE — 93660 TILT TABLE EVALUATION: CPT

## 2021-07-26 ASSESSMENT — ENCOUNTER SYMPTOMS
ABDOMINAL DISTENTION: 0
BLOOD IN STOOL: 0
COLOR CHANGE: 0
BACK PAIN: 0
EYE ITCHING: 0
SHORTNESS OF BREATH: 0
DIARRHEA: 0
VOMITING: 0
EYE REDNESS: 0
CONSTIPATION: 0
COUGH: 0
SINUS PAIN: 0
NAUSEA: 0
ABDOMINAL PAIN: 0
SINUS PRESSURE: 0

## 2021-07-26 NOTE — PROCEDURES
[facility]     Electrophysiology Procedure Note       Date of Procedure: 7/26/2021  Patient's Name: Mirian Perea  YOB: 1990   Medical Record Number: [id number]    Procedure Performed by: Shane Pierce MD     Procedures performed:    Tilt table testing    Indication of the procedure:  Mirian Perea is a 32 y.o. female presented with syncope. Details of procedure:    Procedure's risks, benefits and alternatives of procedure were explained to patient. All questions were answered. Patient understood and informed consent was obtained. The patient was brought to the electrophysiology lab in a fasting nonsedated state. Peripheral IV access was obtained and he was put on the tilt table test.    Initial vital signs at baseline:  BP: 114/73 . HR: 58   RR: 21   O2sat: 100    Then the tilt table was raised to 70 degree. Immediately upon raising the table the vitals were:   BP: 104/60  HR: 47     Range over next 15 minutes;  BP: 106//68  HR: 47-77    Right and left carotid massage alternately, showed no significant change in HR and BP    After 15 minutes, patient received 0.4 mg NTG sublingual.     Immediate post nitro,    BP: 104/68  HR: 81      Range over 10 minutes after NTG, patient felt no symptoms however, and his vital signs were:  BP: 104-117/53-80  HR:   RR: 15-23  O2sat: 98    At this time patient felt no symptoms. The Tilt table test was immediately brought back to [place] position and she received IV fluid bolus. Patient didn't experience syncope during the tilt table test.     At the end of procedure:  BP: 98/62   HR: 44   RR: 15   O2sat: 100    The patient tolerated the procedure well and there were no complications.     Conclusion:    Negative tilt table test.     Patient could still have mild vasovagal symptoms which improved with her compression stocking so recommend to wear compression stockings, keep Hydration - more electrolyte water, Avoid smoking and caffeine, Avoid hot environments - if have to go keep hydrated     Will recommend event monitor     Thanks again for allowing me to participate in care of this patient. Please call me if you have any questions.

## 2021-07-26 NOTE — H&P
Electrophysiology History and Physical       Primary care physician: KATELIN Garrett NP      History of Present Illness:      Matt Mccoy is a 32 y.o. female who presents with complaints  of syncope. She has had 3 syncopal episodes since May 2021. She states that the first 2 times she was bent over shaving her legs, she sat up and became dizzy and lost consciousness. She reports that she woke up immediately after passing out. She states that she did not have urinary incontinence. She states the 3rd time she passed out she was sitting at the kitchen table, she stood up became dizzy, sat back down and then lost consciousness. She states this was witnessed by her boyfriend and she thinks she was unconsciousness for about 10 minutes. She has been wearing compression socks and has helped and she does not have syncope but she still feels dizzy at times. She denies chest pain,  palpitations, shortness of breath, edema, dizziness, or syncope. Past medical history:   Past Medical History:   Diagnosis Date    Asthma     Depression     admitted 2003, 07, 08, 11    Thyroid disease     UTI (lower urinary tract infection)        Surgical history :  Past Surgical History:   Procedure Laterality Date    APPENDECTOMY  2011    CHOLECYSTECTOMY  2007    DILATION AND CURETTAGE OF UTERUS  2010    TUBAL LIGATION  11/16/2012       Family history:   Family History   Problem Relation Age of Onset    Diabetes Father     Heart Failure Father     High Cholesterol Father     Migraines Father     High Blood Pressure Father     Diabetes Mother     Asthma Mother     High Cholesterol Mother     High Blood Pressure Mother     Asthma Brother     Cancer Maternal Grandfather         prostate    Diabetes Maternal Grandfather     High Cholesterol Maternal Grandfather     Hypertension Maternal Grandfather     Colon Cancer Maternal Aunt        Social history :  reports that she has been smoking cigarettes.  She has been smoking about 1.00 pack per day. She has never used smokeless tobacco. She reports current alcohol use. She reports current drug use. Drug: Marijuana. Allergies   Allergen Reactions    Bactrim [Sulfamethoxazole-Trimethoprim] Anaphylaxis, Shortness Of Breath and Rash       Current Outpatient Medications on File Prior to Encounter   Medication Sig Dispense Refill    dicyclomine (BENTYL) 10 MG capsule Take 2 capsules by mouth 4 times daily (before meals and nightly) 30 capsule 0    ondansetron (ZOFRAN ODT) 4 MG disintegrating tablet Take 1 tablet by mouth every 8 hours as needed for Nausea or Vomiting 15 tablet 0    albuterol sulfate HFA (PROAIR HFA) 108 (90 Base) MCG/ACT inhaler Inhale 2 puffs into the lungs every 4 hours as needed for Wheezing or Shortness of Breath 1 Inhaler 2    Diapers & Supplies MISC 1 each by Does not apply route 2 times daily 60 each 11    levothyroxine (LEVOTHROID) 50 MCG tablet Take 1 tablet by mouth daily 30 tablet 1    traZODone (DESYREL) 50 MG tablet Take 50 mg by mouth nightly physciatrist       No current facility-administered medications on file prior to encounter. Review of Systems:   Review of Systems   Constitutional: Negative for activity change, appetite change and fatigue. HENT: Negative for congestion, sinus pressure and sinus pain. Eyes: Negative for redness and itching. Respiratory: Negative for cough and shortness of breath. Cardiovascular: Negative for chest pain, palpitations and leg swelling. Gastrointestinal: Negative for abdominal distention, abdominal pain, blood in stool, constipation, diarrhea, nausea and vomiting. Genitourinary: Negative for difficulty urinating and dysuria. Musculoskeletal: Negative for arthralgias, back pain and gait problem. Skin: Negative for color change, pallor, rash and wound. Neurological: Positive for dizziness, syncope and light-headedness.    Psychiatric/Behavioral: Negative for agitation and confusion. The patient is not nervous/anxious. Physical Examination:    /73   Pulse 58   Resp 21   SpO2 100%    Wt Readings from Last 3 Encounters:   07/16/21 206 lb (93.4 kg)   07/16/19 244 lb (110.7 kg)   02/11/19 220 lb 3.2 oz (99.9 kg)     There is no height or weight on file to calculate BMI. Physical Exam  Constitutional:       Appearance: Normal appearance. She is not ill-appearing or diaphoretic. HENT:      Head: Normocephalic and atraumatic. Right Ear: External ear normal.      Left Ear: External ear normal.      Nose: No congestion or rhinorrhea. Mouth/Throat:      Pharynx: No oropharyngeal exudate or posterior oropharyngeal erythema. Eyes:      General:         Right eye: No discharge. Left eye: No discharge. Conjunctiva/sclera: Conjunctivae normal.   Neck:      Vascular: No carotid bruit. Cardiovascular:      Rate and Rhythm: Regular rhythm. Bradycardia present. Heart sounds: No murmur heard. No friction rub. No gallop. Pulmonary:      Breath sounds: No wheezing or rhonchi. Abdominal:      General: Abdomen is flat. Bowel sounds are normal. There is no distension. Tenderness: There is no abdominal tenderness. Musculoskeletal:      Cervical back: Neck supple. No tenderness. Right lower leg: No edema. Left lower leg: No edema. Skin:     General: Skin is warm and dry. Neurological:      Mental Status: She is oriented to person, place, and time. Psychiatric:         Mood and Affect: Mood normal.         Thought Content:  Thought content normal.       CBC:   Lab Results   Component Value Date    WBC 8.0 05/03/2021    HGB 14.3 05/03/2021    HCT 46.0 05/03/2021     05/03/2021     Lipids:   Lab Results   Component Value Date    CHOL 164 05/03/2021    TRIG 138 05/03/2021    HDL 33 (L) 05/03/2021    LDLCALC 60 05/15/2017    LDLDIRECT 117 (H) 05/03/2021     PT/INR: No results found for: INR     BMP:    Lab Results   Component Value Date     07/23/2021    K 4.3 07/23/2021     07/23/2021    CO2 24 07/23/2021    BUN 16 07/23/2021     CMP:   Lab Results   Component Value Date    AST 13 (L) 04/19/2021    ALT 16 04/19/2021    PROT 7.0 04/19/2021    BILITOT 0.2 04/19/2021    ALKPHOS 60 04/19/2021     TSH:    Lab Results   Component Value Date    TSH 1.67 07/13/2016       EKG Interpretation:        IMPRESSION / RECOMMENDATIONS:     Syncope    Syncope x3- 2 episodes post shower got dizzy and well and woke up immediately. After episode she was talking with friends and she got dizzy and passed out and woke up immediately. Based on her description it appears to be like vasovagal syncope. We will try to get her evaluated with a tilt table test. We will get an ECHO given her history of drug abuse to assess if there is not structural cause for syncope. Discussed with patient to avoid caffeine and also discussed about recent data on vaping with causing acute lung injury. Patient voiced understanding  Patient has been wearing compression socks and has helped and she does not have syncope but does feel dizzy at time. Patient has history of methamphetamine abuse and has been sober for the last 2 years. Thanks again for allowing me to participate in care of thispatient. Please call me if you have any questions. With best regards.         Jayshree Bush, APRN-CNP

## 2021-07-26 NOTE — PROGRESS NOTES
HEAD UP TILT TABLE TEST     Name: Jamee Mendes  Date: 21  : 1990    MRN: [id number]  Physician: No att. providers found     Allergies: Allergies   Allergen Reactions    Bactrim [Sulfamethoxazole-Trimethoprim] Anaphylaxis, Shortness Of Breath and Rash        Medications:   Prior to Visit Medications    Medication Sig Taking?  Authorizing Provider   dicyclomine (BENTYL) 10 MG capsule Take 2 capsules by mouth 4 times daily (before meals and nightly)  Francois Don PA-C   ondansetron (ZOFRAN ODT) 4 MG disintegrating tablet Take 1 tablet by mouth every 8 hours as needed for Nausea or Vomiting  Awais Griffith PA-C   albuterol sulfate HFA (PROAIR HFA) 108 (90 Base) MCG/ACT inhaler Inhale 2 puffs into the lungs every 4 hours as needed for Wheezing or Shortness of Breath  Karyna Contreras MD   Diapers & Supplies MISC 1 each by Does not apply route 2 times daily  Karyna Contreras MD   levothyroxine (LEVOTHROID) 50 MCG tablet Take 1 tablet by mouth daily  Karyna Contreras MD   traZODone (DESYREL) 50 MG tablet Take 50 mg by mouth nightly physciatrist  Historical Provider, MD         Past Medical History:   Diagnosis Date    Asthma     Depression     admitted , , ,     Thyroid disease     UTI (lower urinary tract infection)        Past Surgical History:   Procedure Laterality Date    APPENDECTOMY      CHOLECYSTECTOMY  2007    DILATION AND CURETTAGE OF UTERUS  2010    TUBAL LIGATION  2012       [x] Patient verbalizes understanding of procedure    [x]  Consent obtained  [x]  NPO X13 hours  [x]  IV established with # 22 Site RIGHT FOREARM  [x]  12 Lead EKG obtained   [x]  Electrolytes Na [x]  K [x]  CL [x]  CO2 [x]   Time:    5882            Date:2021  Nursing Notes/ Vital Signs/ LOC/ Skin Color      TIME BP HR RR SPO2 COMMENTS   1349 114/73 58 21 100 SUPINE PRE PROCEDURE   1414 104/60 47 19 100 HEAD UP   1417 122/68 61 20 100 STANDING - TALKING   0087 101/67 24 69 12 NO - COMPLAINTS   1421 115/65 54 13 99 TALKING   1423 106/68 66 22 98 TALKING   1425 107/72 55 20 99 NO COMPLAINTS   1427 128/84 59 15 98 TALKING   1429 111/59 61 18 99 NO COMPLAINTS   1431 119/90 77 14 99 NO COMPLAINTS   1433 111/69 58 21 98 NO COMPLAINTS   1435 106/68 60 20 98 NO COMPLAINTS   1437 116/70 70 15 98 CAROTID MASSAGES DONE- DIZZINESS WITH RIGHT CAROTID MASSAGE   1439 110/74 65 24 98 NITRO SPRAY GIVEN   1441 104/68 81 13 98 STANDING   1442 116/77 95 23 96 TALKING   1444 110/53 95 23 96 STANDING   1445 105/80 92 13 97 STANDING   1447 104/78 101 12 97 STANDING   1448 117/62 104 13 97 STANDING   1450 106/55 89 15 98 END PROCEDURE   1451 98/62 44 15 100 SUPINE

## 2021-07-28 ASSESSMENT — ENCOUNTER SYMPTOMS
DIARRHEA: 0
BLOOD IN STOOL: 0
CONSTIPATION: 0
VOMITING: 0
COLOR CHANGE: 0
BACK PAIN: 0
EYE PAIN: 0
NAUSEA: 0
SHORTNESS OF BREATH: 0
PHOTOPHOBIA: 0
WHEEZING: 0
COUGH: 0
CHEST TIGHTNESS: 0
ABDOMINAL PAIN: 0

## 2021-09-01 ENCOUNTER — TELEPHONE (OUTPATIENT)
Dept: CARDIOLOGY CLINIC | Age: 31
End: 2021-09-01

## 2021-09-02 ENCOUNTER — TELEPHONE (OUTPATIENT)
Dept: CARDIOLOGY CLINIC | Age: 31
End: 2021-09-02

## 2021-09-02 NOTE — TELEPHONE ENCOUNTER
Left message for patient to call the office to see if we can get her Echo scheduled sooner before the authorization  on 21

## 2021-09-24 ENCOUNTER — PROCEDURE VISIT (OUTPATIENT)
Dept: CARDIOLOGY CLINIC | Age: 31
End: 2021-09-24
Payer: COMMERCIAL

## 2021-09-24 DIAGNOSIS — R42 DIZZINESS: Primary | ICD-10-CM

## 2021-09-24 DIAGNOSIS — I49.8 SINUS ARRHYTHMIA: ICD-10-CM

## 2021-09-24 LAB
LV EF: 58 %
LVEF MODALITY: NORMAL

## 2021-09-24 PROCEDURE — 93306 TTE W/DOPPLER COMPLETE: CPT | Performed by: INTERNAL MEDICINE

## 2021-12-13 ENCOUNTER — HOSPITAL ENCOUNTER (OUTPATIENT)
Dept: CT IMAGING | Age: 31
Discharge: HOME | End: 2021-12-13
Payer: COMMERCIAL

## 2021-12-13 DIAGNOSIS — R91.1 INCIDENTAL PULMONARY NODULE, > 3MM AND < 8MM: ICD-10-CM

## 2021-12-13 PROCEDURE — 71250 CT THORAX DX C-: CPT

## 2021-12-15 ENCOUNTER — HOSPITAL ENCOUNTER (OUTPATIENT)
Age: 31
Discharge: HOME | End: 2021-12-15
Payer: COMMERCIAL

## 2021-12-15 LAB
ALBUMIN SERPL-MCNC: 4.3 GM/DL (ref 3.4–5)
ALP BLD-CCNC: 65 IU/L (ref 40–128)
ALT SERPL-CCNC: 8 U/L (ref 10–40)
ANION GAP SERPL CALCULATED.3IONS-SCNC: 12 MMOL/L (ref 4–16)
AST SERPL-CCNC: 11 IU/L (ref 15–37)
BASOPHILS ABSOLUTE: 0.1 K/CU MM
BASOPHILS RELATIVE PERCENT: 0.9 % (ref 0–1)
BILIRUB SERPL-MCNC: 0.6 MG/DL (ref 0–1)
BUN BLDV-MCNC: 15 MG/DL (ref 6–23)
CALCIUM SERPL-MCNC: 9 MG/DL (ref 8.3–10.6)
CHLORIDE BLD-SCNC: 104 MMOL/L (ref 99–110)
CHOLESTEROL: 156 MG/DL
CO2: 21 MMOL/L (ref 21–32)
CREAT SERPL-MCNC: 0.5 MG/DL (ref 0.6–1.1)
DIFFERENTIAL TYPE: ABNORMAL
EOSINOPHILS ABSOLUTE: 0.8 K/CU MM
EOSINOPHILS RELATIVE PERCENT: 8.9 % (ref 0–3)
FERRITIN: 130 NG/ML (ref 15–150)
GFR AFRICAN AMERICAN: >60 ML/MIN/1.73M2
GFR NON-AFRICAN AMERICAN: >60 ML/MIN/1.73M2
GLUCOSE BLD-MCNC: 86 MG/DL (ref 70–99)
HCT VFR BLD CALC: 40.8 % (ref 37–47)
HDLC SERPL-MCNC: 34 MG/DL
HEMOGLOBIN: 12.6 GM/DL (ref 12.5–16)
IMMATURE NEUTROPHIL %: 0.3 % (ref 0–0.43)
LDL CHOLESTEROL DIRECT: 104 MG/DL
LYMPHOCYTES ABSOLUTE: 3.3 K/CU MM
LYMPHOCYTES RELATIVE PERCENT: 35.3 % (ref 24–44)
MCH RBC QN AUTO: 29.8 PG (ref 27–31)
MCHC RBC AUTO-ENTMCNC: 30.9 % (ref 32–36)
MCV RBC AUTO: 96.5 FL (ref 78–100)
MONOCYTES ABSOLUTE: 0.7 K/CU MM
MONOCYTES RELATIVE PERCENT: 7.7 % (ref 0–4)
NUCLEATED RBC %: 0 %
PDW BLD-RTO: 13 % (ref 11.7–14.9)
PLATELET # BLD: 288 K/CU MM (ref 140–440)
PMV BLD AUTO: 11.9 FL (ref 7.5–11.1)
POTASSIUM SERPL-SCNC: 4.4 MMOL/L (ref 3.5–5.1)
RBC # BLD: 4.23 M/CU MM (ref 4.2–5.4)
SEGMENTED NEUTROPHILS ABSOLUTE COUNT: 4.4 K/CU MM
SEGMENTED NEUTROPHILS RELATIVE PERCENT: 46.9 % (ref 36–66)
SODIUM BLD-SCNC: 137 MMOL/L (ref 135–145)
T4 FREE: 1.3 NG/DL (ref 0.9–1.8)
TOTAL IMMATURE NEUTOROPHIL: 0.03 K/CU MM
TOTAL NUCLEATED RBC: 0 K/CU MM
TOTAL PROTEIN: 6.8 GM/DL (ref 6.4–8.2)
TRIGL SERPL-MCNC: 88 MG/DL
TSH HIGH SENSITIVITY: 2.85 UIU/ML (ref 0.27–4.2)
WBC # BLD: 9.3 K/CU MM (ref 4–10.5)

## 2021-12-15 PROCEDURE — 85025 COMPLETE CBC W/AUTO DIFF WBC: CPT

## 2021-12-15 PROCEDURE — 80061 LIPID PANEL: CPT

## 2021-12-15 PROCEDURE — 83721 ASSAY OF BLOOD LIPOPROTEIN: CPT

## 2021-12-15 PROCEDURE — 82728 ASSAY OF FERRITIN: CPT

## 2021-12-15 PROCEDURE — 84443 ASSAY THYROID STIM HORMONE: CPT

## 2021-12-15 PROCEDURE — 80053 COMPREHEN METABOLIC PANEL: CPT

## 2021-12-15 PROCEDURE — 36415 COLL VENOUS BLD VENIPUNCTURE: CPT

## 2021-12-15 PROCEDURE — 84439 ASSAY OF FREE THYROXINE: CPT

## 2022-04-24 ENCOUNTER — APPOINTMENT (OUTPATIENT)
Dept: GENERAL RADIOLOGY | Age: 32
End: 2022-04-24
Payer: COMMERCIAL

## 2022-04-24 ENCOUNTER — HOSPITAL ENCOUNTER (EMERGENCY)
Age: 32
Discharge: HOME | End: 2022-04-25
Payer: COMMERCIAL

## 2022-04-24 VITALS
RESPIRATION RATE: 16 BRPM | TEMPERATURE: 98.2 F | SYSTOLIC BLOOD PRESSURE: 108 MMHG | DIASTOLIC BLOOD PRESSURE: 75 MMHG | BODY MASS INDEX: 40.75 KG/M2 | HEIGHT: 63 IN | HEART RATE: 88 BPM | OXYGEN SATURATION: 96 % | WEIGHT: 230 LBS

## 2022-04-24 DIAGNOSIS — J45.901 EXACERBATION OF ASTHMA, UNSPECIFIED ASTHMA SEVERITY, UNSPECIFIED WHETHER PERSISTENT: Primary | ICD-10-CM

## 2022-04-24 DIAGNOSIS — J10.1 INFLUENZA A: ICD-10-CM

## 2022-04-24 LAB
ALBUMIN SERPL-MCNC: 4.3 GM/DL (ref 3.4–5)
ALP BLD-CCNC: 81 IU/L (ref 40–128)
ALT SERPL-CCNC: 19 U/L (ref 10–40)
ANION GAP SERPL CALCULATED.3IONS-SCNC: 14 MMOL/L (ref 4–16)
AST SERPL-CCNC: 18 IU/L (ref 15–37)
BASOPHILS ABSOLUTE: 0.1 K/CU MM
BASOPHILS RELATIVE PERCENT: 0.6 % (ref 0–1)
BILIRUB SERPL-MCNC: 0.3 MG/DL (ref 0–1)
BUN BLDV-MCNC: 9 MG/DL (ref 6–23)
CALCIUM SERPL-MCNC: 8.9 MG/DL (ref 8.3–10.6)
CHLORIDE BLD-SCNC: 102 MMOL/L (ref 99–110)
CO2: 19 MMOL/L (ref 21–32)
CREAT SERPL-MCNC: 0.5 MG/DL (ref 0.6–1.1)
DIFFERENTIAL TYPE: ABNORMAL
EOSINOPHILS ABSOLUTE: 0.8 K/CU MM
EOSINOPHILS RELATIVE PERCENT: 9.2 % (ref 0–3)
GFR AFRICAN AMERICAN: >60 ML/MIN/1.73M2
GFR NON-AFRICAN AMERICAN: >60 ML/MIN/1.73M2
GLUCOSE BLD-MCNC: 95 MG/DL (ref 70–99)
HCT VFR BLD CALC: 43.2 % (ref 37–47)
HEMOGLOBIN: 14.1 GM/DL (ref 12.5–16)
IMMATURE NEUTROPHIL %: 0.4 % (ref 0–0.43)
LYMPHOCYTES ABSOLUTE: 2.1 K/CU MM
LYMPHOCYTES RELATIVE PERCENT: 24.6 % (ref 24–44)
MCH RBC QN AUTO: 29.4 PG (ref 27–31)
MCHC RBC AUTO-ENTMCNC: 32.6 % (ref 32–36)
MCV RBC AUTO: 90 FL (ref 78–100)
MONOCYTES ABSOLUTE: 0.9 K/CU MM
MONOCYTES RELATIVE PERCENT: 10.6 % (ref 0–4)
NUCLEATED RBC %: 0 %
PDW BLD-RTO: 12.9 % (ref 11.7–14.9)
PLATELET # BLD: 321 K/CU MM (ref 140–440)
PMV BLD AUTO: 10.6 FL (ref 7.5–11.1)
POTASSIUM SERPL-SCNC: 4.4 MMOL/L (ref 3.5–5.1)
RAPID INFLUENZA  B AGN: NEGATIVE
RAPID INFLUENZA A AGN: POSITIVE
RBC # BLD: 4.8 M/CU MM (ref 4.2–5.4)
SEGMENTED NEUTROPHILS ABSOLUTE COUNT: 4.6 K/CU MM
SEGMENTED NEUTROPHILS RELATIVE PERCENT: 54.6 % (ref 36–66)
SODIUM BLD-SCNC: 135 MMOL/L (ref 135–145)
TOTAL IMMATURE NEUTOROPHIL: 0.03 K/CU MM
TOTAL NUCLEATED RBC: 0 K/CU MM
TOTAL PROTEIN: 7.6 GM/DL (ref 6.4–8.2)
TROPONIN T: <0.01 NG/ML
WBC # BLD: 8.5 K/CU MM (ref 4–10.5)

## 2022-04-24 PROCEDURE — 85025 COMPLETE CBC W/AUTO DIFF WBC: CPT

## 2022-04-24 PROCEDURE — 99285 EMERGENCY DEPT VISIT HI MDM: CPT

## 2022-04-24 PROCEDURE — 80053 COMPREHEN METABOLIC PANEL: CPT

## 2022-04-24 PROCEDURE — 84484 ASSAY OF TROPONIN QUANT: CPT

## 2022-04-24 PROCEDURE — 87804 INFLUENZA ASSAY W/OPTIC: CPT

## 2022-04-24 PROCEDURE — 71046 X-RAY EXAM CHEST 2 VIEWS: CPT

## 2022-04-24 PROCEDURE — 93005 ELECTROCARDIOGRAM TRACING: CPT | Performed by: STUDENT IN AN ORGANIZED HEALTH CARE EDUCATION/TRAINING PROGRAM

## 2022-04-24 PROCEDURE — 87635 SARS-COV-2 COVID-19 AMP PRB: CPT

## 2022-04-25 LAB
EKG ATRIAL RATE: 80 BPM
EKG DIAGNOSIS: NORMAL
EKG P AXIS: 57 DEGREES
EKG P-R INTERVAL: 156 MS
EKG Q-T INTERVAL: 354 MS
EKG QRS DURATION: 84 MS
EKG QTC CALCULATION (BAZETT): 408 MS
EKG R AXIS: 9 DEGREES
EKG T AXIS: 41 DEGREES
EKG VENTRICULAR RATE: 80 BPM
SARS-COV-2, NAAT: NOT DETECTED
SOURCE: NORMAL

## 2022-04-25 PROCEDURE — 94640 AIRWAY INHALATION TREATMENT: CPT

## 2022-04-25 PROCEDURE — 93010 ELECTROCARDIOGRAM REPORT: CPT | Performed by: INTERNAL MEDICINE

## 2022-04-25 PROCEDURE — 6370000000 HC RX 637 (ALT 250 FOR IP): Performed by: PHYSICIAN ASSISTANT

## 2022-04-25 RX ORDER — ALBUTEROL SULFATE 90 UG/1
2 AEROSOL, METERED RESPIRATORY (INHALATION) ONCE
Status: COMPLETED | OUTPATIENT
Start: 2022-04-25 | End: 2022-04-25

## 2022-04-25 RX ORDER — PREDNISONE 20 MG/1
40 TABLET ORAL DAILY
Qty: 8 TABLET | Refills: 0 | Status: SHIPPED | OUTPATIENT
Start: 2022-04-25 | End: 2022-04-29

## 2022-04-25 RX ORDER — GUAIFENESIN/DEXTROMETHORPHAN 100-10MG/5
5 SYRUP ORAL ONCE
Status: COMPLETED | OUTPATIENT
Start: 2022-04-25 | End: 2022-04-25

## 2022-04-25 RX ORDER — ACETAMINOPHEN 500 MG
1000 TABLET ORAL ONCE
Status: COMPLETED | OUTPATIENT
Start: 2022-04-25 | End: 2022-04-25

## 2022-04-25 RX ORDER — PREDNISONE 20 MG/1
60 TABLET ORAL ONCE
Status: COMPLETED | OUTPATIENT
Start: 2022-04-25 | End: 2022-04-25

## 2022-04-25 RX ADMIN — PREDNISONE 60 MG: 20 TABLET ORAL at 00:41

## 2022-04-25 RX ADMIN — GUAIFENESIN AND DEXTROMETHORPHAN 5 ML: 100; 10 SYRUP ORAL at 00:40

## 2022-04-25 RX ADMIN — ACETAMINOPHEN 1000 MG: 500 TABLET ORAL at 00:41

## 2022-04-25 RX ADMIN — ALBUTEROL SULFATE 2 PUFF: 90 AEROSOL, METERED RESPIRATORY (INHALATION) at 00:56

## 2022-04-25 NOTE — ED PROVIDER NOTES
EKG normal sinus rhythm with sinus arrhythmia, ventricular rate of 80, LA interval 156, QRS duration 84, QT/QTc 354/408 no ST elevation noted.       Sandra Maldonado, DO  04/24/22 2047

## 2022-04-25 NOTE — ED PROVIDER NOTES
EMERGENCY DEPARTMENT ENCOUNTER      PCP: KATELIN Pavon - NP    [facility]    Chief Complaint   Patient presents with    Shortness of Breath    Cough    Generalized Body Aches    Dizziness       This patient was not evaluated by the attending physician. I have independently evaluated this patient. HPI    Matt Esparza is a 32 y.o. adult who presents with cough, nasal congestion sore throat and ear pain. Onset 4 days ago. Patient has had associate lightheadedness, body aches. Patient states she has history of asthma and has had increased wheezing, shortness of breath. Patient is at associated chest pain with coughing. Patient denies abdominal pain, vomiting or diarrhea. Patient denies pain with urination. Patient denies pregnancy. REVIEW OF SYSTEMS    Constitutional:  Denies fever  HENT: see HPI  Cardiovascular:  See HPI  Respiratory:  See HPI.    GI:  Denies abdominal pain, vomiting, or diarrhea  :  Denies any urinary symptoms  Musculoskeletal: + body aches  Skin:  Denies rash  Neurologic:  Denies focal weakness or sensory changes    Lymphatic:  Denies swollen glands     All other review of systems are negative  See HPI and nursing notes for additional information       PAST MEDICAL & SURGICAL HISTORY    Past Medical History:   Diagnosis Date    Asthma     Depression     admitted 2003, 07, 08, 11    Thyroid disease     UTI (lower urinary tract infection)      Past Surgical History:   Procedure Laterality Date    APPENDECTOMY  2011    CHOLECYSTECTOMY  2007    DILATION AND CURETTAGE OF UTERUS  2010    TUBAL LIGATION  11/16/2012       CURRENT MEDICATIONS    Current Outpatient Rx   Medication Sig Dispense Refill    predniSONE (DELTASONE) 20 MG tablet Take 2 tablets by mouth daily for 4 days 8 tablet 0    guaiFENesin (ROBITUSSIN) 100 MG/5ML liquid 1 teaspoon by mouth every 4-6 hours when necessary cough 120 mL 0    dicyclomine (BENTYL) 10 MG capsule Take 2 capsules by mouth 4 times daily (before meals and nightly) 30 capsule 0    ondansetron (ZOFRAN ODT) 4 MG disintegrating tablet Take 1 tablet by mouth every 8 hours as needed for Nausea or Vomiting 15 tablet 0    albuterol sulfate HFA (PROAIR HFA) 108 (90 Base) MCG/ACT inhaler Inhale 2 puffs into the lungs every 4 hours as needed for Wheezing or Shortness of Breath 1 Inhaler 2    Diapers & Supplies MISC 1 each by Does not apply route 2 times daily 60 each 11    levothyroxine (LEVOTHROID) 50 MCG tablet Take 1 tablet by mouth daily 30 tablet 1    traZODone (DESYREL) 50 MG tablet Take 50 mg by mouth nightly physciatrist         ALLERGIES    Allergies   Allergen Reactions    Bactrim [Sulfamethoxazole-Trimethoprim] Anaphylaxis, Shortness Of Breath and Rash       SOCIAL & FAMILY HISTORY    Social History     Socioeconomic History    Marital status: Legally      Spouse name: None    Number of children: None    Years of education: None    Highest education level: None   Occupational History    Occupation: unemployed   Tobacco Use    Smoking status: Former Smoker     Packs/day: 1.00     Types: Cigarettes    Smokeless tobacco: Never Used   Vaping Use    Vaping Use: Every day   Substance and Sexual Activity    Alcohol use: Yes     Comment: yearly    Drug use: Yes     Types: Marijuana Fronie Nghia)    Sexual activity: None   Other Topics Concern    None   Social History Narrative    None     Social Determinants of Health     Financial Resource Strain:     Difficulty of Paying Living Expenses: Not on file   Food Insecurity:     Worried About Running Out of Food in the Last Year: Not on file    [place] of Food in the Last Year: Not on file   Transportation Needs:     Lack of Transportation (Medical): Not on file    Lack of Transportation (Non-Medical):  Not on file   Physical Activity:     Days of Exercise per Week: Not on file    Minutes of Exercise per Session: Not on file   Stress:     Feeling of Stress : Not on file   Social Connections:     Frequency of Communication with Friends and Family: Not on file    Frequency of Social Gatherings with Friends and Family: Not on file    Attends [employer] Services: Not on file    Active Member of Clubs or Organizations: Not on file    Attends Club or Organization Meetings: Not on file    Marital Status: Not on file   Intimate Partner Violence:     Fear of Current or Ex-Partner: Not on file    Emotionally Abused: Not on file    Physically Abused: Not on file    Sexually Abused: Not on file   Housing Stability:     Unable to Pay for Housing in the Last Year: Not on file    Number of [place] in the Last Year: Not on file    Unstable Housing in the Last Year: Not on file     Family History   Problem Relation Age of Onset    Diabetes Father     Heart Failure Father     High Cholesterol Father     Migraines Father     High Blood Pressure Father     Diabetes Mother     Asthma Mother     High Cholesterol Mother     High Blood Pressure Mother     Asthma Brother     Cancer Maternal Grandfather         prostate    Diabetes Maternal Grandfather     High Cholesterol Maternal Grandfather     Hypertension Maternal Grandfather     Colon Cancer Maternal Aunt        PHYSICAL EXAM    VITAL SIGNS: /75   Pulse 88   Temp 98.2 °F (36.8 °C) (Oral)   Resp 16   Ht 5' 3\" (1.6 m)   Wt 230 lb (104.3 kg)   LMP 04/14/2022   SpO2 96%   BMI 40.74 kg/m²    Constitutional:  Well developed, well nourished, no acute distress   HENT:  Atraumatic, moist mucus membranes. EACs and TMs clear. Oropharynx clear. No sinus tenderness to percussion. Neck/Lymphatics: supple, no JVD, no swollen nodes  Respiratory:   Nonlabored breathing.   Lungs decreased bilaterally with occasional expiratory wheeze, no retractions   Cardiovascular: Normal rate and rhythm  GI:  Soft, nontender, normal bowel sounds  Musculoskeletal:   No edema, no acute deformities  Integument:  Skin is warm and dry, no petechiae Neurologic:  Alert & oriented, no slurred speech  Psych: Pleasant affect, no hallucinations      EKG      EKG Interpretation  Please see ED physician's note for EKG interpretation        LABS:  Results for orders placed or performed during the hospital encounter of 04/24/22   COVID-19, Rapid    Specimen: Nasopharyngeal   Result Value Ref Range    Source THROAT     SARS-CoV-2, NAAT NOT DETECTED NOT DETECTED   Rapid Flu Swab    Specimen: Nasopharyngeal   Result Value Ref Range    Rapid Influenza A Ag POSITIVE (A) NEGATIVE    Rapid Influenza B Ag NEGATIVE NEGATIVE   CBC with Auto Differential   Result Value Ref Range    WBC 8.5 4.0 - 10.5 K/CU MM    RBC 4.80 4.2 - 5.4 M/CU MM    Hemoglobin 14.1 12.5 - 16.0 GM/DL    Hematocrit 43.2 37 - 47 %    MCV 90.0 78 - 100 FL    MCH 29.4 27 - 31 PG    MCHC 32.6 32.0 - 36.0 %    RDW 12.9 11.7 - 14.9 %    Platelets 652 573 - 296 K/CU MM    MPV 10.6 7.5 - 11.1 FL    Differential Type AUTOMATED DIFFERENTIAL     Segs Relative 54.6 36 - 66 %    Lymphocytes % 24.6 24 - 44 %    Monocytes % 10.6 (H) 0 - 4 %    Eosinophils % 9.2 (H) 0 - 3 %    Basophils % 0.6 0 - 1 %    Segs Absolute 4.6 K/CU MM    Lymphocytes Absolute 2.1 K/CU MM    Monocytes Absolute 0.9 K/CU MM    Eosinophils Absolute 0.8 K/CU MM    Basophils Absolute 0.1 K/CU MM    Nucleated RBC % 0.0 %    Total Nucleated RBC 0.0 K/CU MM    Total Immature Neutrophil 0.03 K/CU MM    Immature Neutrophil % 0.4 0 - 0.43 %   Comprehensive Metabolic Panel   Result Value Ref Range    Sodium 135 135 - 145 MMOL/L    Potassium 4.4 3.5 - 5.1 MMOL/L    Chloride 102 99 - 110 mMol/L    CO2 19 (L) 21 - 32 MMOL/L    BUN 9 6 - 23 MG/DL    CREATININE 0.5 (L) 0.6 - 1.1 MG/DL    Glucose 95 70 - 99 MG/DL    Calcium 8.9 8.3 - 10.6 MG/DL    Albumin 4.3 3.4 - 5.0 GM/DL    Total Protein 7.6 6.4 - 8.2 GM/DL    Total Bilirubin 0.3 0.0 - 1.0 MG/DL    ALT 19 10 - 40 U/L    AST 18 15 - 37 IU/L    Alkaline Phosphatase 81 40 - 128 IU/L    GFR Non- >60 >60 mL/min/1.73m2    GFR African American >60 >60 mL/min/1.73m2    Anion Gap 14 4 - 16   Troponin   Result Value Ref Range    Troponin T <0.010 <0.01 NG/ML           RADIOLOGY/PROCEDURES    XR CHEST (2 VW)   Final Result   No acute process. ED COURSE & MEDICAL DECISION MAKING      Presents as above. Patient provided cough medication, inhaler treatment, steroids and Tylenol. See physician note for EKG reading. Chest x-ray shows no acute process. Rapid flu is positive for influenza A. Rapid COVID-negative. CBC and CMP grossly within normal limits. Troponin negative. I suspect patient's chest pain is musculoskeletal in nature. I do not believe patient has PE causing chest pain I suspect patient's influenza A is exacerbating her asthma. Patient has increased air movement on reevaluation. Vital signs are stable. Patient will be provided prescription for prednisone burst, cough medication. Patient states she has inhalers at home. Recommend symptomatic treatment increase rest, fluids, ibuprofen and Tylenol. Recommend follow-up with primary care provider in 2 days for recheck. Clinical  IMPRESSION    1. Exacerbation of asthma, unspecified asthma severity, unspecified whether persistent    2. Influenza A          Diagnosis and plan discussed in detail with patient. Patient agrees to return emergency department if symptoms worsen or any new symptoms develop. Comment: Please note this report has been produced using speech recognition software and may contain errors related to that system including errors in grammar, punctuation, and spelling, as well as words and phrases that may be inappropriate. If there are any questions or concerns please feel free to contact the dictating provider for clarification.                           Courtney Smith PA-C  04/25/22 [id number]

## 2022-12-15 ENCOUNTER — HOSPITAL ENCOUNTER (OUTPATIENT)
Dept: CT IMAGING | Age: 32
Discharge: HOME | End: 2022-12-15
Payer: COMMERCIAL

## 2022-12-15 DIAGNOSIS — R91.1 INCIDENTAL PULMONARY NODULE, > 3MM AND < 8MM: ICD-10-CM

## 2022-12-15 PROCEDURE — 71250 CT THORAX DX C-: CPT
